# Patient Record
Sex: MALE | Race: WHITE | NOT HISPANIC OR LATINO | Employment: OTHER
[De-identification: names, ages, dates, MRNs, and addresses within clinical notes are randomized per-mention and may not be internally consistent; named-entity substitution may affect disease eponyms.]

---

## 2017-11-13 ENCOUNTER — RECORDS - HEALTHEAST (OUTPATIENT)
Dept: ADMINISTRATIVE | Facility: OTHER | Age: 62
End: 2017-11-13

## 2017-11-15 ENCOUNTER — AMBULATORY - HEALTHEAST (OUTPATIENT)
Dept: SURGERY | Facility: CLINIC | Age: 62
End: 2017-11-15

## 2017-11-15 DIAGNOSIS — K42.9 UMBILICAL HERNIA WITHOUT OBSTRUCTION OR GANGRENE: ICD-10-CM

## 2017-11-17 ENCOUNTER — OFFICE VISIT - HEALTHEAST (OUTPATIENT)
Dept: SURGERY | Facility: CLINIC | Age: 62
End: 2017-11-17

## 2017-11-17 DIAGNOSIS — K42.9 UMBILICAL HERNIA WITHOUT OBSTRUCTION AND WITHOUT GANGRENE: ICD-10-CM

## 2017-11-17 DIAGNOSIS — D36.7 CYST, DERMOID, ARM, LEFT: ICD-10-CM

## 2017-11-17 ASSESSMENT — MIFFLIN-ST. JEOR: SCORE: 2387.6

## 2017-12-06 ASSESSMENT — MIFFLIN-ST. JEOR: SCORE: 2378.07

## 2017-12-07 ENCOUNTER — SURGERY - HEALTHEAST (OUTPATIENT)
Dept: SURGERY | Facility: HOSPITAL | Age: 62
End: 2017-12-07

## 2017-12-07 ENCOUNTER — ANESTHESIA - HEALTHEAST (OUTPATIENT)
Dept: SURGERY | Facility: HOSPITAL | Age: 62
End: 2017-12-07

## 2017-12-22 ENCOUNTER — OFFICE VISIT - HEALTHEAST (OUTPATIENT)
Dept: SURGERY | Facility: CLINIC | Age: 62
End: 2017-12-22

## 2017-12-22 DIAGNOSIS — K42.9 UMBILICAL HERNIA WITHOUT OBSTRUCTION AND WITHOUT GANGRENE: ICD-10-CM

## 2017-12-22 ASSESSMENT — MIFFLIN-ST. JEOR: SCORE: 2378.07

## 2019-03-19 ENCOUNTER — RECORDS - HEALTHEAST (OUTPATIENT)
Dept: LAB | Facility: CLINIC | Age: 64
End: 2019-03-19

## 2019-03-19 LAB
ANION GAP SERPL CALCULATED.3IONS-SCNC: 8 MMOL/L (ref 5–18)
BUN SERPL-MCNC: 16 MG/DL (ref 8–22)
CALCIUM SERPL-MCNC: 9.7 MG/DL (ref 8.5–10.5)
CHLORIDE BLD-SCNC: 102 MMOL/L (ref 98–107)
CO2 SERPL-SCNC: 29 MMOL/L (ref 22–31)
CREAT SERPL-MCNC: 0.9 MG/DL (ref 0.7–1.3)
GFR SERPL CREATININE-BSD FRML MDRD: >60 ML/MIN/1.73M2
GLUCOSE BLD-MCNC: 102 MG/DL (ref 70–125)
POTASSIUM BLD-SCNC: 4.6 MMOL/L (ref 3.5–5)
SODIUM SERPL-SCNC: 139 MMOL/L (ref 136–145)

## 2019-10-24 ENCOUNTER — HOSPITAL ENCOUNTER (EMERGENCY)
Facility: CLINIC | Age: 64
Discharge: HOME | End: 2019-10-24
Attending: NURSE PRACTITIONER | Admitting: NURSE PRACTITIONER
Payer: COMMERCIAL

## 2019-10-24 ENCOUNTER — APPOINTMENT (OUTPATIENT)
Dept: GENERAL RADIOLOGY | Facility: CLINIC | Age: 64
End: 2019-10-24
Attending: NURSE PRACTITIONER
Payer: COMMERCIAL

## 2019-10-24 VITALS
TEMPERATURE: 97.6 F | HEIGHT: 71 IN | WEIGHT: 300 LBS | SYSTOLIC BLOOD PRESSURE: 118 MMHG | DIASTOLIC BLOOD PRESSURE: 89 MMHG | OXYGEN SATURATION: 94 % | BODY MASS INDEX: 42 KG/M2 | HEART RATE: 64 BPM

## 2019-10-24 DIAGNOSIS — J06.9 VIRAL URI: ICD-10-CM

## 2019-10-24 DIAGNOSIS — B08.5 ACUTE HERPANGINA: ICD-10-CM

## 2019-10-24 DIAGNOSIS — L25.9 CONTACT DERMATITIS: ICD-10-CM

## 2019-10-24 PROBLEM — G47.33 OBSTRUCTIVE SLEEP APNEA SYNDROME: Status: ACTIVE | Noted: 2017-12-07

## 2019-10-24 PROBLEM — I10 ESSENTIAL HYPERTENSION: Status: ACTIVE | Noted: 2017-12-07

## 2019-10-24 LAB
ALBUMIN SERPL-MCNC: 3.5 G/DL (ref 3.4–5)
ALP SERPL-CCNC: 58 U/L (ref 40–150)
ALT SERPL W P-5'-P-CCNC: 35 U/L (ref 0–70)
ANION GAP SERPL CALCULATED.3IONS-SCNC: 5 MMOL/L (ref 3–14)
AST SERPL W P-5'-P-CCNC: 20 U/L (ref 0–45)
BASOPHILS # BLD AUTO: 0 10E9/L (ref 0–0.2)
BASOPHILS NFR BLD AUTO: 0.5 %
BILIRUB SERPL-MCNC: 0.4 MG/DL (ref 0.2–1.3)
BUN SERPL-MCNC: 11 MG/DL (ref 7–30)
CALCIUM SERPL-MCNC: 9 MG/DL (ref 8.5–10.1)
CHLORIDE SERPL-SCNC: 104 MMOL/L (ref 94–109)
CO2 SERPL-SCNC: 27 MMOL/L (ref 20–32)
CREAT SERPL-MCNC: 1.05 MG/DL (ref 0.66–1.25)
DEPRECATED S PYO AG THROAT QL EIA: NORMAL
DIFFERENTIAL METHOD BLD: NORMAL
EOSINOPHIL # BLD AUTO: 0.4 10E9/L (ref 0–0.7)
EOSINOPHIL NFR BLD AUTO: 4.5 %
ERYTHROCYTE [DISTWIDTH] IN BLOOD BY AUTOMATED COUNT: 13.2 % (ref 10–15)
GFR SERPL CREATININE-BSD FRML MDRD: 76 ML/MIN/{1.73_M2}
GLUCOSE SERPL-MCNC: 110 MG/DL (ref 70–99)
HCT VFR BLD AUTO: 45.9 % (ref 40–53)
HGB BLD-MCNC: 15.2 G/DL (ref 13.3–17.7)
IMM GRANULOCYTES # BLD: 0.1 10E9/L (ref 0–0.4)
IMM GRANULOCYTES NFR BLD: 0.8 %
LYMPHOCYTES # BLD AUTO: 1.2 10E9/L (ref 0.8–5.3)
LYMPHOCYTES NFR BLD AUTO: 15.1 %
MCH RBC QN AUTO: 30 PG (ref 26.5–33)
MCHC RBC AUTO-ENTMCNC: 33.1 G/DL (ref 31.5–36.5)
MCV RBC AUTO: 91 FL (ref 78–100)
MONOCYTES # BLD AUTO: 0.7 10E9/L (ref 0–1.3)
MONOCYTES NFR BLD AUTO: 8.8 %
NEUTROPHILS # BLD AUTO: 5.4 10E9/L (ref 1.6–8.3)
NEUTROPHILS NFR BLD AUTO: 70.3 %
NRBC # BLD AUTO: 0 10*3/UL
NRBC BLD AUTO-RTO: 0 /100
PLATELET # BLD AUTO: 294 10E9/L (ref 150–450)
POTASSIUM SERPL-SCNC: 4.1 MMOL/L (ref 3.4–5.3)
PROT SERPL-MCNC: 7.2 G/DL (ref 6.8–8.8)
RBC # BLD AUTO: 5.06 10E12/L (ref 4.4–5.9)
SODIUM SERPL-SCNC: 136 MMOL/L (ref 133–144)
SPECIMEN SOURCE: NORMAL
WBC # BLD AUTO: 7.7 10E9/L (ref 4–11)

## 2019-10-24 PROCEDURE — 87880 STREP A ASSAY W/OPTIC: CPT | Performed by: NURSE PRACTITIONER

## 2019-10-24 PROCEDURE — 96360 HYDRATION IV INFUSION INIT: CPT | Performed by: NURSE PRACTITIONER

## 2019-10-24 PROCEDURE — 99284 EMERGENCY DEPT VISIT MOD MDM: CPT | Mod: 25 | Performed by: NURSE PRACTITIONER

## 2019-10-24 PROCEDURE — 71046 X-RAY EXAM CHEST 2 VIEWS: CPT

## 2019-10-24 PROCEDURE — 25000132 ZZH RX MED GY IP 250 OP 250 PS 637: Performed by: NURSE PRACTITIONER

## 2019-10-24 PROCEDURE — 25000128 H RX IP 250 OP 636: Performed by: NURSE PRACTITIONER

## 2019-10-24 PROCEDURE — 99284 EMERGENCY DEPT VISIT MOD MDM: CPT | Mod: Z6 | Performed by: NURSE PRACTITIONER

## 2019-10-24 PROCEDURE — 87081 CULTURE SCREEN ONLY: CPT | Performed by: NURSE PRACTITIONER

## 2019-10-24 PROCEDURE — 85025 COMPLETE CBC W/AUTO DIFF WBC: CPT | Performed by: NURSE PRACTITIONER

## 2019-10-24 PROCEDURE — 25000131 ZZH RX MED GY IP 250 OP 636 PS 637: Performed by: NURSE PRACTITIONER

## 2019-10-24 PROCEDURE — 80053 COMPREHEN METABOLIC PANEL: CPT | Performed by: NURSE PRACTITIONER

## 2019-10-24 RX ORDER — PREDNISONE 20 MG/1
TABLET ORAL
Qty: 9 TABLET | Refills: 0 | Status: SHIPPED | OUTPATIENT
Start: 2019-10-24 | End: 2019-11-03

## 2019-10-24 RX ORDER — PREDNISONE 20 MG/1
40 TABLET ORAL ONCE
Status: COMPLETED | OUTPATIENT
Start: 2019-10-24 | End: 2019-10-24

## 2019-10-24 RX ORDER — LISINOPRIL 10 MG/1
TABLET ORAL
COMMUNITY
Start: 2018-09-06

## 2019-10-24 RX ORDER — DIPHENHYDRAMINE HCL 25 MG
50 CAPSULE ORAL ONCE
Status: COMPLETED | OUTPATIENT
Start: 2019-10-24 | End: 2019-10-24

## 2019-10-24 RX ORDER — SODIUM CHLORIDE 9 MG/ML
1000 INJECTION, SOLUTION INTRAVENOUS CONTINUOUS
Status: DISCONTINUED | OUTPATIENT
Start: 2019-10-24 | End: 2019-10-24 | Stop reason: HOSPADM

## 2019-10-24 RX ORDER — METOPROLOL SUCCINATE 50 MG/1
50 TABLET, EXTENDED RELEASE ORAL
COMMUNITY

## 2019-10-24 RX ADMIN — PREDNISONE 40 MG: 20 TABLET ORAL at 10:38

## 2019-10-24 RX ADMIN — SODIUM CHLORIDE 1000 ML: 9 INJECTION, SOLUTION INTRAVENOUS at 10:38

## 2019-10-24 RX ADMIN — DIPHENHYDRAMINE HYDROCHLORIDE 50 MG: 25 CAPSULE ORAL at 10:38

## 2019-10-24 ASSESSMENT — ENCOUNTER SYMPTOMS
NAUSEA: 0
MYALGIAS: 0
SHORTNESS OF BREATH: 1
TROUBLE SWALLOWING: 1
CHILLS: 1
DIZZINESS: 0
VOMITING: 0
LIGHT-HEADEDNESS: 0
SORE THROAT: 1
BACK PAIN: 0
HEADACHES: 0
COUGH: 1
ABDOMINAL PAIN: 0
WHEEZING: 0
FEVER: 0

## 2019-10-24 ASSESSMENT — MIFFLIN-ST. JEOR: SCORE: 2182.92

## 2019-10-24 NOTE — ED PROVIDER NOTES
"  History     Chief Complaint   Patient presents with     Allergic Reaction     poss poison oak, swollen lip and tongue, hard to swallow     HPI  Onofre Garg is a 64 year old male who presents to the emergency department for evaluation of exposure to poison oak.  This occurred 3 days ago when he was mowing grass.  Patient developed widespread pruritic rash.  Predominant on his upper arms.  Patient also reports a sore throat, and feels his lip and tongue are swollen.  Pain with swallowing.  Also reports exposure to grandchildren with respiratory \"cold\" and another with pneumonia.     Allergies:  Allergies   Allergen Reactions     Oxycodone      Abdominal pain; see 4-23-12 ED report, and [facility] report, [facility].        Problem List:    Patient Active Problem List    Diagnosis Date Noted     Essential hypertension 12/07/2017     Priority: Medium     Obstructive sleep apnea syndrome 12/07/2017     Priority: Medium     Cardiomyopathy (H) 06/24/2016     Priority: Medium     Advanced directives, counseling/discussion 09/24/2012     Priority: Medium     Discussed advance care planning with patient; however, patient declined at this time. 9/24/2012          Seasonal allergic rhinitis 09/24/2012     Priority: Medium     CARDIOVASCULAR SCREENING; LDL GOAL LESS THAN 160 10/31/2010     Priority: Medium     Diverticulosis of colon 12/22/2008     Priority: Medium     Colonoscopy Dec 2008       Screening for hypertension 12/22/2008     Priority: Medium     Impotence of organic origin 08/17/2006     Priority: Medium     Obesity 08/17/2006     Priority: Medium     Problem list name updated by automated process. Provider to review          Past Medical History:    Past Medical History:   Diagnosis Date     Disorders of bursae and tendons in shoulder region, unspecified      Obesity, unspecified        Past Surgical History:    Past Surgical History:   Procedure Laterality Date     ENDOSCOPIC RETROGRADE " "CHOLANGIOPANCREATOGRAM  10/4/2011    Procedure:ENDOSCOPIC RETROGRADE CHOLANGIOPANCREATOGRAM; ERCP, biliary duct sphincterotomy, baloon dilatation, stone extraction and stent placement; Surgeon:TERE BARBOSA; Location:[facility] OR     LAPAROSCOPIC CHOLECYSTECTOMY  10/6/2011    Procedure:LAPAROSCOPIC CHOLECYSTECTOMY; Laparoscopic Cholecystectomy; Surgeon:HALEY TRINH; Location:[facility] OR       Family History:    Family History   Problem Relation Age of Onset     Diabetes Mother      Cerebrovascular Disease Mother      Eye Disorder Father      Allergies Daughter      Allergies Daughter        Social History:  Marital Status:   [2]  Social History     Tobacco Use     Smoking status: Never Smoker     Smokeless tobacco: Never Used   Substance Use Topics     Alcohol use: Yes     Alcohol/week: 5.0 standard drinks     Types: 6 Cans of beer per week     Comment: on weekends / socially     Drug use: No        Medications:    lisinopril (PRINIVIL/ZESTRIL) 10 MG tablet  predniSONE (DELTASONE) 20 MG tablet  fluticasone (FLONASE) 50 MCG/ACT nasal spray  metoprolol succinate ER (TOPROL-XL) 50 MG 24 hr tablet  pseudoePHEDrine (SUDAFED) 120 MG 12 hr tablet  sildenafil (VIAGRA) 50 MG tablet          Review of Systems   Constitutional: Positive for chills. Negative for fever.   HENT: Positive for congestion, sore throat and trouble swallowing.    Respiratory: Positive for cough and shortness of breath. Negative for wheezing.    Cardiovascular: Negative for chest pain.   Gastrointestinal: Negative for abdominal pain, nausea and vomiting.   Genitourinary: Negative.    Musculoskeletal: Negative for back pain and myalgias.   Skin: Positive for rash.   Neurological: Negative for dizziness, light-headedness and headaches.       Physical Exam   BP: (!) 154/83  Pulse: 72  Temp: 97.6  F (36.4  C)  Height: 180.3 cm (5' 11\")  Weight: 136.1 kg (300 lb)  SpO2: 93 %      Physical Exam  Constitutional:       General: He is in acute distress. "      Appearance: He is obese.   HENT:      Head: Normocephalic and atraumatic.      Right Ear: Tympanic membrane and external ear normal.      Left Ear: Tympanic membrane and external ear normal.      Nose: Nose normal.      Mouth/Throat:      Lips: Pink.      Mouth: Mucous membranes are moist.      Tongue: No lesions. Tongue does not protrude in midline.      Palate: Lesions present. No mass.      Pharynx: Oropharynx is clear. Uvula midline. Posterior oropharyngeal erythema present. No pharyngeal swelling, oropharyngeal exudate or uvula swelling.      Comments: Ulcerations along the buccal region of the lower lip.  Posterior soft palate ulcerated lesions.  Cardiovascular:      Rate and Rhythm: Normal rate and regular rhythm.   Pulmonary:      Effort: Pulmonary effort is normal.      Breath sounds: Normal breath sounds.   Skin:     Findings: Rash present. Rash is urticarial (vesicular and papular lesions widespread scattered and most coalesced on the underside of his upper arms).      Comments: Scattered lesions. Lesion on hands, palms, upper arms and in mouth. Reports very pruritic.    Neurological:      Mental Status: He is alert.         ED Course        Procedures             Results for orders placed or performed during the hospital encounter of 10/24/19 (from the past 24 hour(s))   CBC with platelets differential   Result Value Ref Range    WBC 7.7 4.0 - 11.0 10e9/L    RBC Count 5.06 4.4 - 5.9 10e12/L    Hemoglobin 15.2 13.3 - 17.7 g/dL    Hematocrit 45.9 40.0 - 53.0 %    MCV 91 78 - 100 fl    MCH 30.0 26.5 - 33.0 pg    MCHC 33.1 31.5 - 36.5 g/dL    RDW 13.2 10.0 - 15.0 %    Platelet Count 294 150 - 450 10e9/L    Diff Method Automated Method     % Neutrophils 70.3 %    % Lymphocytes 15.1 %    % Monocytes 8.8 %    % Eosinophils 4.5 %    % Basophils 0.5 %    % Immature Granulocytes 0.8 %    Nucleated RBCs 0 0 /100    Absolute Neutrophil 5.4 1.6 - 8.3 10e9/L    Absolute Lymphocytes 1.2 0.8 - 5.3 10e9/L     Absolute Monocytes 0.7 0.0 - 1.3 10e9/L    Absolute Eosinophils 0.4 0.0 - 0.7 10e9/L    Absolute Basophils 0.0 0.0 - 0.2 10e9/L    Abs Immature Granulocytes 0.1 0 - 0.4 10e9/L    Absolute Nucleated RBC 0.0    Comprehensive metabolic panel   Result Value Ref Range    Sodium 136 133 - 144 mmol/L    Potassium 4.1 3.4 - 5.3 mmol/L    Chloride 104 94 - 109 mmol/L    Carbon Dioxide 27 20 - 32 mmol/L    Anion Gap 5 3 - 14 mmol/L    Glucose 110 (H) 70 - 99 mg/dL    Urea Nitrogen 11 7 - 30 mg/dL    Creatinine 1.05 0.66 - 1.25 mg/dL    GFR Estimate 76 >60 mL/min/[1.73_m2]    GFR Estimate If Black 88 >60 mL/min/[1.73_m2]    Calcium 9.0 8.5 - 10.1 mg/dL    Bilirubin Total 0.4 0.2 - 1.3 mg/dL    Albumin 3.5 3.4 - 5.0 g/dL    Protein Total 7.2 6.8 - 8.8 g/dL    Alkaline Phosphatase 58 40 - 150 U/L    ALT 35 0 - 70 U/L    AST 20 0 - 45 U/L   Rapid Strep Screen   Result Value Ref Range    Specimen Description Throat     Rapid Strep A Screen       NEGATIVE: No Group A streptococcal antigen detected by immunoassay, await culture report.   XR Chest 2 Views    Narrative    CHEST TWO VIEWS   10/24/2019 10:53 AM     HISTORY: Cough, short of breath.    COMPARISON: Chest x-ray on 4/23/2012      Impression    IMPRESSION: No acute airspace disease.    RENATO STRONG MD       Medications   0.9% sodium chloride BOLUS (0 mLs Intravenous Stopped 10/24/19 1145)     Followed by   sodium chloride 0.9% infusion (has no administration in time range)   diphenhydrAMINE (BENADRYL) capsule 50 mg (50 mg Oral Given 10/24/19 1038)   predniSONE (DELTASONE) tablet 40 mg (40 mg Oral Given 10/24/19 1038)       Assessments & Plan (with Medical Decision Making)     64 year old male who presents to the ED with pruritic rash, sore throat and lowe lip swelling.  Patient reports exposure to poison oak while mowing grass on Saturday and [place] with subsequent pruritic rash. Also with exposure to grandchildren with URI illness and another with pneumonia, and  developed sore throat and lower lip feels swollen the last 2 days. Positive on ROS for cough, congestion, mouth sores, and short of breath.    On exam patient is morbidly obese, thick neck. Lung sounds CTA. No tachypnea or hypoxia. Several ulcerated lesions (herpangina) noted on the buccal surface of his lower lip and along the posterior soft palate. Posterior oropharynx erythema. urticarial lesions scattered and widespread, but coalesced along the underside of both his upper arms.  I have low suspicion for anaphylactic reaction. I did consider angioedema since he complains of tongue and lip swelling and is on Lisinopril --- however, patient has no obvious swelling of the tongue, it does not protrude, patient is easily able to swallow, and talking without problems. RST is negative. Labs are unremarkable. Chest xray is negative for pneumonia. I suspect patient is having a contact dermatitis due to exposure/reaction to poison oak/ivy/sumac.  His posterior oropharynx and herpangina consistent with an enterovirus that is likely due this exposure to grandchildren with similar respiratory illness. Patient was given PO benadryl, PO prednisone and IV NS bolus here in the ED.       Plan as follows for treatment of contact dermatitis (poison oak reaction):    Prednisone 20 mg tablets, take as follows:  (starting tomorrow morning, you received today's dose in the emergency department)  2 tablets tomorrow and Saturday, (10/25 and 10/26)  Then 1 tablet Sunday and Monday, (10/27 and 10/28)  Then 1/2 tablet Tuesday-Sunday. (10/29 - 11/3)   Benadryl 25-50 mg every 8 hours as needed for itching.    Treatment for viral uri/herpangina:  Drink plenty of fluids to stay hydrated.  Tylenol or Ibuprofen for pain.  Return to the emergency department for fevers, shortness of breath, chest pain, increased swelling of lip/tongue, or any new symptoms of concern.    I have reviewed the nursing notes.    I have reviewed the findings, diagnosis,  plan and need for follow up with the patient.      Discharge Medication List as of 10/24/2019 11:46 AM      START taking these medications    Details   predniSONE (DELTASONE) 20 MG tablet 2 tabs day 1-2, then 1 tab days 3-4, then 1/2 tab daily for 6 days, Disp-9 tablet, R-0, E-PrescribeStarting tomorrow 10/25/2019. Received dose in the emergency department today.             Final diagnoses:   Contact dermatitis - exposure to poison oak   Viral URI   Acute herpangina       10/24/2019   [facility] EMERGENCY DEPARTMENT     Patti Zepeda APRN CNP  10/24/19 1257

## 2019-10-24 NOTE — ED NOTES
Allergic reaction to poison oak while mowing. Rash under armpits and legs. Lip and tongue swollen. Ulcer lesions in mouth. Feeling SOB. Using cream for rashes.

## 2019-10-24 NOTE — DISCHARGE INSTRUCTIONS
Prednisone 20 mg tablets, take as follows:  (starting tomorrow morning, you received today's dose in the emergency department)  2 tablets tomorrow and Saturday, (10/25 and 10/26)  Then 1 tablet Sunday and Monday, (10/27 and 10/28)  Then 1/2 tablet Tuesday-Sunday. (10/29 - 11/3)     Benadryl 25-50 mg every 8 hours as needed for itching.    Drink plenty of fluids to stay hydrated.  Tylenol or Ibuprofen for pain.  Return to the emergency department for fevers, shortness of breath, chest pain, increased swelling of lip/tongue, or any new symptoms of concern.

## 2019-10-24 NOTE — ED AVS SNAPSHOT
[facility] Emergency Department  [address] [address]  Phone:  [phone]  Fax:  [phone]                                    Onofre Garg   MRN: [id number]    Department:  [facility] Emergency Department   Date of Visit:  10/24/2019           After Visit Summary Signature Page    I have received my discharge instructions, and my questions have been answered. I have discussed any challenges I see with this plan with the nurse or doctor.    ..........................................................................................................................................  Patient/Patient Representative Signature      ..........................................................................................................................................  Patient Representative Print Name and Relationship to Patient    ..................................................               ................................................  Date                                   Time    ..........................................................................................................................................  Reviewed by Signature/Title    ...................................................              ..............................................  Date                                               Time          22EPIC Rev 08/18

## 2019-10-26 LAB
BACTERIA SPEC CULT: NORMAL
Lab: NORMAL
SPECIMEN SOURCE: NORMAL

## 2019-12-30 ENCOUNTER — RECORDS - HEALTHEAST (OUTPATIENT)
Dept: LAB | Facility: CLINIC | Age: 64
End: 2019-12-30

## 2019-12-30 LAB
ALBUMIN UR-MCNC: NEGATIVE MG/DL
APPEARANCE UR: CLEAR
BACTERIA #/AREA URNS HPF: ABNORMAL HPF
BILIRUB UR QL STRIP: NEGATIVE
COLOR UR AUTO: YELLOW
GLUCOSE UR STRIP-MCNC: NEGATIVE MG/DL
HGB UR QL STRIP: NEGATIVE
KETONES UR STRIP-MCNC: NEGATIVE MG/DL
LEUKOCYTE ESTERASE UR QL STRIP: NEGATIVE
MUCOUS THREADS #/AREA URNS LPF: ABNORMAL LPF
NITRATE UR QL: NEGATIVE
PH UR STRIP: 6.5 [PH] (ref 4.5–8)
RBC #/AREA URNS AUTO: ABNORMAL HPF
SP GR UR STRIP: 1.01 (ref 1–1.03)
SQUAMOUS #/AREA URNS AUTO: ABNORMAL LPF
UROBILINOGEN UR STRIP-ACNC: ABNORMAL
WBC #/AREA URNS AUTO: ABNORMAL HPF

## 2019-12-31 LAB — BACTERIA SPEC CULT: NO GROWTH

## 2020-04-13 ENCOUNTER — RECORDS - HEALTHEAST (OUTPATIENT)
Dept: LAB | Facility: CLINIC | Age: 65
End: 2020-04-13

## 2020-04-13 LAB
ALBUMIN SERPL-MCNC: 3.7 G/DL (ref 3.5–5)
ANION GAP SERPL CALCULATED.3IONS-SCNC: 10 MMOL/L (ref 5–18)
BUN SERPL-MCNC: 16 MG/DL (ref 8–22)
CALCIUM SERPL-MCNC: 9.2 MG/DL (ref 8.5–10.5)
CHLORIDE BLD-SCNC: 101 MMOL/L (ref 98–107)
CHOLEST SERPL-MCNC: 216 MG/DL
CO2 SERPL-SCNC: 28 MMOL/L (ref 22–31)
CREAT SERPL-MCNC: 1.11 MG/DL (ref 0.7–1.3)
FASTING STATUS PATIENT QL REPORTED: ABNORMAL
GFR SERPL CREATININE-BSD FRML MDRD: >60 ML/MIN/1.73M2
GLUCOSE BLD-MCNC: 100 MG/DL (ref 70–125)
HDLC SERPL-MCNC: 42 MG/DL
LDLC SERPL CALC-MCNC: 123 MG/DL
LDLC SERPL CALC-MCNC: ABNORMAL MG/DL
PHOSPHATE SERPL-MCNC: 3.3 MG/DL (ref 2.5–4.5)
POTASSIUM BLD-SCNC: 4.8 MMOL/L (ref 3.5–5)
PSA SERPL-MCNC: 4.7 NG/ML (ref 0–4.5)
SODIUM SERPL-SCNC: 139 MMOL/L (ref 136–145)
TRIGL SERPL-MCNC: 528 MG/DL

## 2020-04-29 ENCOUNTER — COMMUNICATION - HEALTHEAST (OUTPATIENT)
Dept: SCHEDULING | Facility: CLINIC | Age: 65
End: 2020-04-29

## 2020-04-29 ENCOUNTER — NURSE TRIAGE (OUTPATIENT)
Dept: NURSING | Facility: CLINIC | Age: 65
End: 2020-04-29

## 2020-05-04 ENCOUNTER — AMBULATORY - HEALTHEAST (OUTPATIENT)
Dept: FAMILY MEDICINE | Facility: CLINIC | Age: 65
End: 2020-05-04

## 2020-05-04 DIAGNOSIS — E66.9 OBESITY WITH SERIOUS COMORBIDITY, UNSPECIFIED CLASSIFICATION, UNSPECIFIED OBESITY TYPE: ICD-10-CM

## 2020-05-05 ENCOUNTER — AMBULATORY - HEALTHEAST (OUTPATIENT)
Dept: LAB | Facility: CLINIC | Age: 65
End: 2020-05-05

## 2020-05-05 DIAGNOSIS — E66.9 OBESITY WITH SERIOUS COMORBIDITY, UNSPECIFIED CLASSIFICATION, UNSPECIFIED OBESITY TYPE: ICD-10-CM

## 2020-05-05 LAB
ALBUMIN SERPL-MCNC: 3.8 G/DL (ref 3.5–5)
ALP SERPL-CCNC: 62 U/L (ref 45–120)
ALT SERPL W P-5'-P-CCNC: 36 U/L (ref 0–45)
AST SERPL W P-5'-P-CCNC: 27 U/L (ref 0–40)
BILIRUB DIRECT SERPL-MCNC: 0.1 MG/DL
BILIRUB SERPL-MCNC: 0.4 MG/DL (ref 0–1)
HBA1C MFR BLD: 5.3 % (ref 3.5–6)
PROT SERPL-MCNC: 6.6 G/DL (ref 6–8)
TSH SERPL DL<=0.005 MIU/L-ACNC: 4.18 UIU/ML (ref 0.3–5)

## 2020-05-08 LAB — 25(OH)D3 SERPL-MCNC: 28 NG/ML (ref 30–80)

## 2020-05-11 ENCOUNTER — OFFICE VISIT - HEALTHEAST (OUTPATIENT)
Dept: FAMILY MEDICINE | Facility: CLINIC | Age: 65
End: 2020-05-11

## 2020-05-11 DIAGNOSIS — G47.33 OBSTRUCTIVE SLEEP APNEA: ICD-10-CM

## 2020-05-11 DIAGNOSIS — E78.1 HYPERTRIGLYCERIDEMIA: ICD-10-CM

## 2020-05-11 DIAGNOSIS — E66.01 MORBID OBESITY (H): ICD-10-CM

## 2020-05-11 DIAGNOSIS — I10 ESSENTIAL HYPERTENSION: ICD-10-CM

## 2020-05-12 ENCOUNTER — COMMUNICATION - HEALTHEAST (OUTPATIENT)
Dept: FAMILY MEDICINE | Facility: CLINIC | Age: 65
End: 2020-05-12

## 2020-06-08 ENCOUNTER — OFFICE VISIT - HEALTHEAST (OUTPATIENT)
Dept: FAMILY MEDICINE | Facility: CLINIC | Age: 65
End: 2020-06-08

## 2020-06-08 DIAGNOSIS — E66.01 MORBID OBESITY (H): ICD-10-CM

## 2020-06-08 DIAGNOSIS — E66.01 MORBID OBESITY WITH BMI OF 45.0-49.9, ADULT (H): ICD-10-CM

## 2020-07-02 ENCOUNTER — COMMUNICATION - HEALTHEAST (OUTPATIENT)
Dept: FAMILY MEDICINE | Facility: CLINIC | Age: 65
End: 2020-07-02

## 2020-07-02 DIAGNOSIS — E66.01 MORBID OBESITY (H): ICD-10-CM

## 2020-07-06 ENCOUNTER — COMMUNICATION - HEALTHEAST (OUTPATIENT)
Dept: FAMILY MEDICINE | Facility: CLINIC | Age: 65
End: 2020-07-06

## 2020-07-06 ENCOUNTER — OFFICE VISIT - HEALTHEAST (OUTPATIENT)
Dept: FAMILY MEDICINE | Facility: CLINIC | Age: 65
End: 2020-07-06

## 2020-07-06 DIAGNOSIS — E66.01 MORBID OBESITY WITH BMI OF 45.0-49.9, ADULT (H): ICD-10-CM

## 2020-07-06 DIAGNOSIS — E66.01 MORBID OBESITY (H): ICD-10-CM

## 2020-07-06 ASSESSMENT — PATIENT HEALTH QUESTIONNAIRE - PHQ9: SUM OF ALL RESPONSES TO PHQ QUESTIONS 1-9: 1

## 2020-08-03 ENCOUNTER — OFFICE VISIT - HEALTHEAST (OUTPATIENT)
Dept: FAMILY MEDICINE | Facility: CLINIC | Age: 65
End: 2020-08-03

## 2020-08-03 DIAGNOSIS — E66.01 MORBID OBESITY WITH BMI OF 45.0-49.9, ADULT (H): ICD-10-CM

## 2020-08-03 DIAGNOSIS — E66.01 MORBID OBESITY (H): ICD-10-CM

## 2020-09-03 ENCOUNTER — OFFICE VISIT - HEALTHEAST (OUTPATIENT)
Dept: FAMILY MEDICINE | Facility: CLINIC | Age: 65
End: 2020-09-03

## 2020-09-03 DIAGNOSIS — E66.01 MORBID OBESITY WITH BMI OF 45.0-49.9, ADULT (H): ICD-10-CM

## 2020-09-21 ENCOUNTER — COMMUNICATION - HEALTHEAST (OUTPATIENT)
Dept: FAMILY MEDICINE | Facility: CLINIC | Age: 65
End: 2020-09-21

## 2020-10-01 ENCOUNTER — OFFICE VISIT - HEALTHEAST (OUTPATIENT)
Dept: FAMILY MEDICINE | Facility: CLINIC | Age: 65
End: 2020-10-01

## 2020-10-01 DIAGNOSIS — E66.01 MORBID OBESITY WITH BMI OF 45.0-49.9, ADULT (H): ICD-10-CM

## 2020-10-17 ENCOUNTER — HOSPITAL ENCOUNTER (EMERGENCY)
Facility: CLINIC | Age: 65
Discharge: HOME | End: 2020-10-17
Attending: EMERGENCY MEDICINE | Admitting: EMERGENCY MEDICINE
Payer: COMMERCIAL

## 2020-10-17 VITALS
HEART RATE: 78 BPM | HEIGHT: 71 IN | RESPIRATION RATE: 18 BRPM | SYSTOLIC BLOOD PRESSURE: 131 MMHG | DIASTOLIC BLOOD PRESSURE: 57 MMHG | BODY MASS INDEX: 44.1 KG/M2 | OXYGEN SATURATION: 95 % | TEMPERATURE: 97.6 F | WEIGHT: 315 LBS

## 2020-10-17 DIAGNOSIS — K61.0 PERIANAL ABSCESS: ICD-10-CM

## 2020-10-17 LAB
ANION GAP SERPL CALCULATED.3IONS-SCNC: 5 MMOL/L (ref 3–14)
BASOPHILS # BLD AUTO: 0 10E9/L (ref 0–0.2)
BASOPHILS NFR BLD AUTO: 0.2 %
BUN SERPL-MCNC: 14 MG/DL (ref 7–30)
CALCIUM SERPL-MCNC: 8.8 MG/DL (ref 8.5–10.1)
CHLORIDE SERPL-SCNC: 105 MMOL/L (ref 94–109)
CO2 SERPL-SCNC: 27 MMOL/L (ref 20–32)
CREAT SERPL-MCNC: 1.05 MG/DL (ref 0.66–1.25)
DIFFERENTIAL METHOD BLD: ABNORMAL
EOSINOPHIL # BLD AUTO: 0.1 10E9/L (ref 0–0.7)
EOSINOPHIL NFR BLD AUTO: 1.1 %
ERYTHROCYTE [DISTWIDTH] IN BLOOD BY AUTOMATED COUNT: 13.6 % (ref 10–15)
GFR SERPL CREATININE-BSD FRML MDRD: 75 ML/MIN/{1.73_M2}
GLUCOSE SERPL-MCNC: 109 MG/DL (ref 70–99)
HCT VFR BLD AUTO: 42.8 % (ref 40–53)
HGB BLD-MCNC: 14.5 G/DL (ref 13.3–17.7)
IMM GRANULOCYTES # BLD: 0.1 10E9/L (ref 0–0.4)
IMM GRANULOCYTES NFR BLD: 0.6 %
LYMPHOCYTES # BLD AUTO: 1.4 10E9/L (ref 0.8–5.3)
LYMPHOCYTES NFR BLD AUTO: 12.8 %
MCH RBC QN AUTO: 29.5 PG (ref 26.5–33)
MCHC RBC AUTO-ENTMCNC: 33.9 G/DL (ref 31.5–36.5)
MCV RBC AUTO: 87 FL (ref 78–100)
MONOCYTES # BLD AUTO: 0.9 10E9/L (ref 0–1.3)
MONOCYTES NFR BLD AUTO: 7.9 %
NEUTROPHILS # BLD AUTO: 8.4 10E9/L (ref 1.6–8.3)
NEUTROPHILS NFR BLD AUTO: 77.4 %
NRBC # BLD AUTO: 0 10*3/UL
NRBC BLD AUTO-RTO: 0 /100
PLATELET # BLD AUTO: 308 10E9/L (ref 150–450)
POTASSIUM SERPL-SCNC: 4.1 MMOL/L (ref 3.4–5.3)
RBC # BLD AUTO: 4.91 10E12/L (ref 4.4–5.9)
SODIUM SERPL-SCNC: 137 MMOL/L (ref 133–144)
WBC # BLD AUTO: 10.8 10E9/L (ref 4–11)

## 2020-10-17 PROCEDURE — 96375 TX/PRO/DX INJ NEW DRUG ADDON: CPT | Performed by: EMERGENCY MEDICINE

## 2020-10-17 PROCEDURE — 96376 TX/PRO/DX INJ SAME DRUG ADON: CPT | Performed by: EMERGENCY MEDICINE

## 2020-10-17 PROCEDURE — 96361 HYDRATE IV INFUSION ADD-ON: CPT | Performed by: EMERGENCY MEDICINE

## 2020-10-17 PROCEDURE — 96374 THER/PROPH/DIAG INJ IV PUSH: CPT | Performed by: EMERGENCY MEDICINE

## 2020-10-17 PROCEDURE — 80048 BASIC METABOLIC PNL TOTAL CA: CPT | Performed by: EMERGENCY MEDICINE

## 2020-10-17 PROCEDURE — 99285 EMERGENCY DEPT VISIT HI MDM: CPT | Performed by: EMERGENCY MEDICINE

## 2020-10-17 PROCEDURE — 258N000003 HC RX IP 258 OP 636: Performed by: EMERGENCY MEDICINE

## 2020-10-17 PROCEDURE — 99285 EMERGENCY DEPT VISIT HI MDM: CPT | Mod: 25 | Performed by: EMERGENCY MEDICINE

## 2020-10-17 PROCEDURE — 250N000011 HC RX IP 250 OP 636: Performed by: EMERGENCY MEDICINE

## 2020-10-17 PROCEDURE — 85025 COMPLETE CBC W/AUTO DIFF WBC: CPT | Performed by: EMERGENCY MEDICINE

## 2020-10-17 PROCEDURE — 46050 I&D PERIANAL ABSCESS SUPFC: CPT | Performed by: EMERGENCY MEDICINE

## 2020-10-17 RX ORDER — ONDANSETRON 2 MG/ML
4 INJECTION INTRAMUSCULAR; INTRAVENOUS ONCE
Status: COMPLETED | OUTPATIENT
Start: 2020-10-17 | End: 2020-10-17

## 2020-10-17 RX ORDER — HYDROMORPHONE HYDROCHLORIDE 1 MG/ML
0.5 INJECTION, SOLUTION INTRAMUSCULAR; INTRAVENOUS; SUBCUTANEOUS ONCE
Status: COMPLETED | OUTPATIENT
Start: 2020-10-17 | End: 2020-10-17

## 2020-10-17 RX ORDER — HYDROMORPHONE HYDROCHLORIDE 2 MG/1
2-4 TABLET ORAL EVERY 6 HOURS PRN
Qty: 12 TABLET | Refills: 0 | Status: SHIPPED | OUTPATIENT
Start: 2020-10-17

## 2020-10-17 RX ORDER — SODIUM CHLORIDE 9 MG/ML
INJECTION, SOLUTION INTRAVENOUS CONTINUOUS
Status: DISCONTINUED | OUTPATIENT
Start: 2020-10-17 | End: 2020-10-17 | Stop reason: HOSPADM

## 2020-10-17 RX ADMIN — ONDANSETRON 4 MG: 2 INJECTION INTRAMUSCULAR; INTRAVENOUS at 15:29

## 2020-10-17 RX ADMIN — SODIUM CHLORIDE: 9 INJECTION, SOLUTION INTRAVENOUS at 15:29

## 2020-10-17 RX ADMIN — HYDROMORPHONE HYDROCHLORIDE 0.5 MG: 1 INJECTION, SOLUTION INTRAMUSCULAR; INTRAVENOUS; SUBCUTANEOUS at 17:03

## 2020-10-17 RX ADMIN — HYDROMORPHONE HYDROCHLORIDE 1 MG: 1 INJECTION, SOLUTION INTRAMUSCULAR; INTRAVENOUS; SUBCUTANEOUS at 15:29

## 2020-10-17 ASSESSMENT — MIFFLIN-ST. JEOR: SCORE: 2486.37

## 2020-10-17 NOTE — ED NOTES
Pt c/o pain on tailbone that he states is a cyst. Has had similar cysts in the past. Denies any chills or fevers.

## 2020-10-17 NOTE — ED AVS SNAPSHOT
[facility] Emergency Dept  [address] [address]  Phone: [phone]  Fax: [phone]                                    Onofre Garg   MRN: [id number]    Department: [facility] Emergency Dept   Date of Visit: 10/17/2020           After Visit Summary Signature Page    I have received my discharge instructions, and my questions have been answered. I have discussed any challenges I see with this plan with the nurse or doctor.    ..........................................................................................................................................  Patient/Patient Representative Signature      ..........................................................................................................................................  Patient Representative Print Name and Relationship to Patient    ..................................................               ................................................  Date                                   Time    ..........................................................................................................................................  Reviewed by Signature/Title    ...................................................              ..............................................  Date                                               Time          22EPIC Rev 08/18

## 2020-10-17 NOTE — ED PROVIDER NOTES
"  History     Chief Complaint   Patient presents with     Cyst     tailbone     HPI  Onofre Garg is a 65 year old male with several days of worsening, enlarging painful lesion in the superior perianal region.  No drainage.  No fever or chills.  No abdominal pain.  No prior history of similar abscesses or MRSA.  He reports that the pain is severe, constant, 'ungodly\" and unrelieved by OTC analgesics.    Allergies:  Allergies   Allergen Reactions     Oxycodone      Abdominal pain; see 4-23-12 ED report, and [facility] report, [facility].        Problem List:    Patient Active Problem List    Diagnosis Date Noted     Essential hypertension 12/07/2017     Priority: Medium     Obstructive sleep apnea syndrome 12/07/2017     Priority: Medium     Cardiomyopathy (H) 06/24/2016     Priority: Medium     Advanced directives, counseling/discussion 09/24/2012     Priority: Medium     Discussed advance care planning with patient; however, patient declined at this time. 9/24/2012          Seasonal allergic rhinitis 09/24/2012     Priority: Medium     CARDIOVASCULAR SCREENING; LDL GOAL LESS THAN 160 10/31/2010     Priority: Medium     Diverticulosis of colon 12/22/2008     Priority: Medium     Colonoscopy Dec 2008       Screening for hypertension 12/22/2008     Priority: Medium     Impotence of organic origin 08/17/2006     Priority: Medium     Obesity 08/17/2006     Priority: Medium     Problem list name updated by automated process. Provider to review          Past Medical History:    Past Medical History:   Diagnosis Date     Disorders of bursae and tendons in shoulder region, unspecified      Obesity, unspecified        Past Surgical History:    Past Surgical History:   Procedure Laterality Date     ENDOSCOPIC RETROGRADE CHOLANGIOPANCREATOGRAM  10/4/2011    Procedure:ENDOSCOPIC RETROGRADE CHOLANGIOPANCREATOGRAM; ERCP, biliary duct sphincterotomy, baloon dilatation, stone extraction and stent placement; " "Surgeon:TERE BARBOSA; Location:[facility] OR     LAPAROSCOPIC CHOLECYSTECTOMY  10/6/2011    Procedure:LAPAROSCOPIC CHOLECYSTECTOMY; Laparoscopic Cholecystectomy; Surgeon:HALEY TRINH; Location: OR       Family History:    Family History   Problem Relation Age of Onset     Diabetes Mother      Cerebrovascular Disease Mother      Eye Disorder Father      Allergies Daughter      Allergies Daughter        Social History:  Marital Status:   [2]  Social History     Tobacco Use     Smoking status: Never Smoker     Smokeless tobacco: Never Used   Substance Use Topics     Alcohol use: Yes     Alcohol/week: 5.0 standard drinks     Types: 6 Cans of beer per week     Comment: on weekends / socially     Drug use: No        Medications:         amoxicillin-clavulanate (AUGMENTIN) 875-125 MG tablet       HYDROmorphone (DILAUDID) 2 MG tablet       fluticasone (FLONASE) 50 MCG/ACT nasal spray       lisinopril (PRINIVIL/ZESTRIL) 10 MG tablet       metoprolol succinate ER (TOPROL-XL) 50 MG 24 hr tablet       pseudoePHEDrine (SUDAFED) 120 MG 12 hr tablet       sildenafil (VIAGRA) 50 MG tablet      Review of Systems  As mentioned above in the history present illness.  All other systems were reviewed and are negative.    Physical Exam   BP: (!) 159/81  Pulse: 78  Temp: 97.6  F (36.4  C)  Resp: 22  Height: 180.3 cm (5' 11\")  Weight: (!) 166.9 kg (368 lb)  SpO2: 98 %      Physical Exam  Vitals signs and nursing note reviewed.   Constitutional:       General: He is not in acute distress.     Appearance: Normal appearance. He is well-developed. He is not ill-appearing or diaphoretic.   HENT:      Head: Normocephalic and atraumatic.      Right Ear: External ear normal.      Left Ear: External ear normal.      Nose: Nose normal.   Eyes:      General: No scleral icterus.     Extraocular Movements: Extraocular movements intact.      Conjunctiva/sclera: Conjunctivae normal.   Neck:      Musculoskeletal: Normal range of motion and " neck supple.      Trachea: No tracheal deviation.   Cardiovascular:      Rate and Rhythm: Normal rate and regular rhythm.      Heart sounds: Normal heart sounds. No murmur. No friction rub. No gallop.    Pulmonary:      Effort: Pulmonary effort is normal. No respiratory distress.      Breath sounds: Normal breath sounds. No wheezing, rhonchi or rales.   Abdominal:      General: There is no distension.      Palpations: Abdomen is soft.      Tenderness: There is no abdominal tenderness.   Genitourinary:     Comments:  examination: 3.5 x 2.5 x 2 cm abscess at the 1:00 perianal position.  Soft, fluctuant, without pointing or expressible drainage.  No surrounding cellulitis.  Musculoskeletal: Normal range of motion.         General: No tenderness.      Right lower leg: No edema.      Left lower leg: No edema.   Skin:     General: Skin is warm and dry.      Coloration: Skin is not pale.      Findings: No erythema or rash.   Neurological:      General: No focal deficit present.      Mental Status: He is alert and oriented to person, place, and time.      Coordination: Coordination normal.   Psychiatric:         Mood and Affect: Mood normal.         Behavior: Behavior normal.         ED Course        Procedures               No results found for this or any previous visit (from the past 24 hour(s)).    Medications   HYDROmorphone (DILAUDID) injection 1 mg (1 mg Intravenous Given 10/17/20 1529)   ondansetron (ZOFRAN) injection 4 mg (4 mg Intravenous Given 10/17/20 1529)   HYDROmorphone (PF) (DILAUDID) injection 0.5 mg (0.5 mg Intravenous Given 10/17/20 1703)       3:15 PM - I reviewed the case consult with the surgeon on-call, Dr. Ballesteros.  He will see the patient in the emergency department.      Assessments & Plan (with Medical Decision Making)   Perianal abscess which was incised and drained by the surgeon on-call prior to discharge.  Rx Augmentin prophylactically and Dilaudid po as he reports allergies to oxycodone. He  was provided instructions for supportive care and will return as needed for worsened condition or worsening symptoms, or new problems or concerns.  Wound was left open to drainage and surgeon reported he will see the patient as needed in follow-up.      I have reviewed the nursing notes.    I have reviewed the findings, diagnosis, plan and need for follow up with the patient.    Discharge Medication List as of 10/17/2020  5:09 PM      START taking these medications    Details   amoxicillin-clavulanate (AUGMENTIN) 875-125 MG tablet Take 1 tablet by mouth 2 times daily for 10 days, Disp-20 tablet, R-0, E-Prescribe      HYDROmorphone (DILAUDID) 2 MG tablet Take 1-2 tablets (2-4 mg) by mouth every 6 hours as needed for pain, Disp-12 tablet, R-0, E-Prescribe             Final diagnoses:   Perianal abscess       10/17/2020   [facility] EMERGENCY DEPT     John Kelley MD  10/19/20 2041

## 2020-10-22 ENCOUNTER — COMMUNICATION - HEALTHEAST (OUTPATIENT)
Dept: FAMILY MEDICINE | Facility: CLINIC | Age: 65
End: 2020-10-22

## 2020-10-22 DIAGNOSIS — E66.01 MORBID OBESITY WITH BMI OF 45.0-49.9, ADULT (H): ICD-10-CM

## 2020-11-04 ENCOUNTER — OFFICE VISIT - HEALTHEAST (OUTPATIENT)
Dept: FAMILY MEDICINE | Facility: CLINIC | Age: 65
End: 2020-11-04

## 2020-11-04 DIAGNOSIS — E66.01 MORBID OBESITY WITH BMI OF 45.0-49.9, ADULT (H): ICD-10-CM

## 2020-11-04 DIAGNOSIS — G47.33 OBSTRUCTIVE SLEEP APNEA: ICD-10-CM

## 2020-11-04 DIAGNOSIS — I10 ESSENTIAL HYPERTENSION: ICD-10-CM

## 2020-11-06 ENCOUNTER — OFFICE VISIT - HEALTHEAST (OUTPATIENT)
Dept: SURGERY | Facility: CLINIC | Age: 65
End: 2020-11-06

## 2020-11-06 DIAGNOSIS — E66.01 MORBID OBESITY WITH BMI OF 50.0-59.9, ADULT (H): ICD-10-CM

## 2020-11-06 ASSESSMENT — MIFFLIN-ST. JEOR: SCORE: 2499.97

## 2020-11-10 ENCOUNTER — COMMUNICATION - HEALTHEAST (OUTPATIENT)
Dept: FAMILY MEDICINE | Facility: CLINIC | Age: 65
End: 2020-11-10

## 2020-11-10 DIAGNOSIS — E66.01 MORBID OBESITY WITH BMI OF 45.0-49.9, ADULT (H): ICD-10-CM

## 2020-11-16 ENCOUNTER — COMMUNICATION - HEALTHEAST (OUTPATIENT)
Dept: FAMILY MEDICINE | Facility: CLINIC | Age: 65
End: 2020-11-16

## 2020-11-17 ENCOUNTER — COMMUNICATION - HEALTHEAST (OUTPATIENT)
Dept: FAMILY MEDICINE | Facility: CLINIC | Age: 65
End: 2020-11-17

## 2020-11-17 DIAGNOSIS — E66.01 MORBID OBESITY WITH BMI OF 45.0-49.9, ADULT (H): ICD-10-CM

## 2020-11-20 ENCOUNTER — COMMUNICATION - HEALTHEAST (OUTPATIENT)
Dept: FAMILY MEDICINE | Facility: CLINIC | Age: 65
End: 2020-11-20

## 2020-12-04 ENCOUNTER — OFFICE VISIT - HEALTHEAST (OUTPATIENT)
Dept: SURGERY | Facility: CLINIC | Age: 65
End: 2020-12-04

## 2020-12-04 DIAGNOSIS — E66.01 MORBID OBESITY WITH BMI OF 50.0-59.9, ADULT (H): ICD-10-CM

## 2020-12-04 ASSESSMENT — MIFFLIN-ST. JEOR: SCORE: 2499.97

## 2020-12-11 ENCOUNTER — COMMUNICATION - HEALTHEAST (OUTPATIENT)
Dept: FAMILY MEDICINE | Facility: CLINIC | Age: 65
End: 2020-12-11

## 2020-12-11 DIAGNOSIS — E66.01 MORBID OBESITY WITH BMI OF 45.0-49.9, ADULT (H): ICD-10-CM

## 2021-01-04 ENCOUNTER — OFFICE VISIT - HEALTHEAST (OUTPATIENT)
Dept: SURGERY | Facility: CLINIC | Age: 66
End: 2021-01-04

## 2021-01-04 DIAGNOSIS — E66.01 MORBID OBESITY WITH BMI OF 50.0-59.9, ADULT (H): ICD-10-CM

## 2021-01-04 ASSESSMENT — MIFFLIN-ST. JEOR: SCORE: 2509.05

## 2021-01-12 ENCOUNTER — COMMUNICATION - HEALTHEAST (OUTPATIENT)
Dept: FAMILY MEDICINE | Facility: CLINIC | Age: 66
End: 2021-01-12

## 2021-01-12 DIAGNOSIS — E66.01 MORBID OBESITY WITH BMI OF 45.0-49.9, ADULT (H): ICD-10-CM

## 2021-01-14 ENCOUNTER — COMMUNICATION - HEALTHEAST (OUTPATIENT)
Dept: FAMILY MEDICINE | Facility: CLINIC | Age: 66
End: 2021-01-14

## 2021-01-19 ENCOUNTER — OFFICE VISIT - HEALTHEAST (OUTPATIENT)
Dept: FAMILY MEDICINE | Facility: CLINIC | Age: 66
End: 2021-01-19

## 2021-01-19 DIAGNOSIS — E66.813 CLASS 3 SEVERE OBESITY WITH SERIOUS COMORBIDITY AND BODY MASS INDEX (BMI) OF 50.0 TO 59.9 IN ADULT, UNSPECIFIED OBESITY TYPE (H): ICD-10-CM

## 2021-01-19 DIAGNOSIS — E66.01 CLASS 3 SEVERE OBESITY WITH SERIOUS COMORBIDITY AND BODY MASS INDEX (BMI) OF 50.0 TO 59.9 IN ADULT, UNSPECIFIED OBESITY TYPE (H): ICD-10-CM

## 2021-01-19 ASSESSMENT — MIFFLIN-ST. JEOR: SCORE: 2539.89

## 2021-02-03 ENCOUNTER — OFFICE VISIT - HEALTHEAST (OUTPATIENT)
Dept: SURGERY | Facility: CLINIC | Age: 66
End: 2021-02-03

## 2021-02-03 DIAGNOSIS — E66.01 MORBID OBESITY WITH BMI OF 50.0-59.9, ADULT (H): ICD-10-CM

## 2021-02-03 ASSESSMENT — MIFFLIN-ST. JEOR: SCORE: 2518.12

## 2021-02-08 ENCOUNTER — COMMUNICATION - HEALTHEAST (OUTPATIENT)
Dept: FAMILY MEDICINE | Facility: CLINIC | Age: 66
End: 2021-02-08

## 2021-02-08 DIAGNOSIS — E66.01 MORBID OBESITY WITH BMI OF 45.0-49.9, ADULT (H): ICD-10-CM

## 2021-05-27 ASSESSMENT — PATIENT HEALTH QUESTIONNAIRE - PHQ9: SUM OF ALL RESPONSES TO PHQ QUESTIONS 1-9: 1

## 2021-05-31 VITALS — HEIGHT: 71 IN | BODY MASS INDEX: 44.1 KG/M2 | WEIGHT: 315 LBS

## 2021-05-31 VITALS — WEIGHT: 315 LBS | BODY MASS INDEX: 44.1 KG/M2 | HEIGHT: 71 IN

## 2021-06-04 VITALS — BODY MASS INDEX: 53.7 KG/M2 | WEIGHT: 315 LBS

## 2021-06-04 VITALS — WEIGHT: 315 LBS | BODY MASS INDEX: 54.17 KG/M2

## 2021-06-05 VITALS — WEIGHT: 315 LBS | HEIGHT: 71 IN | BODY MASS INDEX: 44.1 KG/M2

## 2021-06-05 VITALS — BODY MASS INDEX: 44.1 KG/M2 | HEIGHT: 71 IN | WEIGHT: 315 LBS

## 2021-06-05 VITALS — WEIGHT: 315 LBS | BODY MASS INDEX: 44.1 KG/M2 | HEIGHT: 71 IN

## 2021-06-05 VITALS
DIASTOLIC BLOOD PRESSURE: 78 MMHG | HEIGHT: 71 IN | BODY MASS INDEX: 44.1 KG/M2 | HEART RATE: 60 BPM | WEIGHT: 315 LBS | SYSTOLIC BLOOD PRESSURE: 141 MMHG | OXYGEN SATURATION: 94 %

## 2021-06-07 NOTE — TELEPHONE ENCOUNTER
Onofre is calling and states that he has been heavy his entire life.  Onofre has tried nutrisystem, nutrimost and has worked.  Onofre fell and broke his pelvis and was in nursing home and lost one pound a day.  Onofre is looking for a diet plan or medication to help with losing weight.  Onofre is 380 pounds now.  Onofre is requesting to establish a primary and set up an appointment and have a discussion and a plan on how to lose weight and explore opportunities that are available to him.       Reason for Disposition    Nursing judgment or information in reference    Protocols used: NO GUIDELINE [id number]

## 2021-06-08 NOTE — PROGRESS NOTES
"Onofre Garg is a 64 y.o. male who is being evaluated via a billable telephone visit.      The patient has been notified of following:     \"This telephone visit will be conducted via a call between you and your physician/provider. We have found that certain health care needs can be provided without the need for a physical exam.  This service lets us provide the care you need with a short phone conversation.  If a prescription is necessary we can send it directly to your pharmacy.  If lab work is needed we can place an order for that and you can then stop by our lab to have the test done at a later time.    Telephone visits are billed at different rates depending on your insurance coverage. During this emergency period, for some insurers they may be billed the same as an in-person visit.  Please reach out to your insurance provider with any questions.    If during the course of the call the physician/provider feels a telephone visit is not appropriate, you will not be charged for this service.\"    Patient has given verbal consent to a Telephone visit? Yes    What phone number would you like to be contacted at? Listed home    Patient would like to receive their AVS by AVS Preference: Karlos.    Additional provider notes:  Assessment/ Plan     1. Morbid obesity (H)  Patient presents for a telephone visit today for comprehensive weight management start.  We discussed both short-term and long-term goals outlined below.  As part of his comprehensive plan, he would like to try medication.  Will start with phentermine, half a tab in the morning.  We will then have a follow-up telephone visit in 4 weeks.  Patient will check his weight weekly and write this down so we can assess his weight loss.  Reviewed potential adverse side effects with patient.  We discussed possibly adding topiramate at some point in the future.  He does have cardiomyopathy listed in his chart, but review shows that this was related to an episode of " "atrial fibrillation.  He has had an ablation and a repeat echo that was normal.  - phentermine (ADIPEX-P) 37.5 mg tablet; Take 1/2 tablet daily in the morning.  Dispense: 30 tablet; Refill: 0    2. Obstructive sleep apnea  Patient has sleep apnea and uses CPAP nightly.    3. Essential hypertension  Under good control with his current medication.    4. Hypertriglyceridemia  Currently not on any medication.      Subjective:       Onofre Garg is a 64 y.o. male who presents for a comprehensive weight loss management intake.  Patient was mailed the intake form, but he was unable to get it back to us.  He was unable to figure out his smart phone to do a video visit, so this was converted to a telephone visit.  I reviewed the entire packet with him verbally.  He states that he is wanting to lose weight due to some health problems.  He states his motivation is about a 5 out of 10.  He states is not at a 10 out of 10 because he states that he is single and he has a hard time staying motivated to make meals.  He states that he has had trouble with his weight his whole life.  He states that currently his weight is 285.  He states that his goal weight is 240 and he thinks he was there over a year ago.  He attributes about 100 pound weight gain to an accident that he had in 2015.  He fell approximately 14 feet and broke his pelvis, ankle, and leg.  He states he was in a nursing home for about 3 months and during this time he gained about 100 pounds.  He states shortly after that he had atrial fibrillation and then had a heart ablation.  He initially did have some cardiomyopathy related to the atrial fibrillation.  His repeat echocardiogram was normal.  He does have benign essential hypertension which is under good control.  His only medication is metoprolol.  He states that he has had success with different diets in the past.  He did \"Neutra most\" and lost about 40 pounds.  It took him about 4 years to regain the weight.  He " states he is also done [employer] and [employer].  He states that he tends to lose weight on those systems but then regains it and then extra weight when he stops doing the preprepared foods.  Social history: He is retired.  He used to work construction.  His hobbies include working in the yard.  He does like to ride motorcycles as well.  He is single so does all the cooking and the grocery shopping.  He does not smoke.  He does not drink any sugar sweetened beverages.  He does drink about a 12 pack of beer per week.  He does not use any street drugs.  Family history is significant for a brother with colon cancer and another brother with prostate cancer.  Past medical history significant for sleep apnea for which he uses CPap, hypertension, elevated triglycerides, elevated PSA which is being followed, surgery for gallbladder and hernia.  He denies depression or anxiety.  As far as his diet, he states typically in the morning he will have garcia, eggs, and toast or cereal.  Lately he has been having a [employer] meal replacement blended with the banana.  For lunch she will have soup or a sandwich.  For supper he will have burger, pizza, spaghetti, typically something pretty easy.  Then, once over the weekend he will make something more involved like a ham or chicken.  He states he really does not eat a lot of vegetables.  He finds them inconvenient.  He states he really does not have a lot of cravings for foods.  He states he used to but this sort of went away after he did 1 of the [employer] programs.  He states occasionally he will binge eat when he gets bored on snacks maybe a couple times per week.  He states he is trying to keep active.  Before the coronavirus pandemic, he was going to the gym at least 3 times per week.  At the gym he was doing a combination of cardio and weight training.  He states because of his previous accident, it is hard for him to walk and stand.  He does not feel comfortable walking around  his neighborhood.  He does not have any equipment at home.  He does not track his calories and states he would not do anything on his smart phone such as lloyd to track his calories.  He does have a scale and is willing to weigh himself once a week.  He does like the meal replacements and would be willing to do that twice a day.  He is very interested in trying a medication.  I did have a long discussion with him that medication alone will not cause him to lose weight, he has to do the other things that we have talked about.  We discussed long-term and short-term goals.  I discussed with him that a 10% weight loss would be reasonable over the next 4 to 6 months.  I discussed medication options with him and potential side effects.  Patient's long-term goal: Initially states he would like to lose 140 pounds, but states he be happy with a 40 pound weight loss over the next 4 to 6 months.  Short-term goals for the next month: Patient is willing to weigh himself once weekly.  Until he can get back to the gym, he will try to be more active around the house.  He will have one meal in the middle of the day and use a meal replacement in the morning and the evening.  He will try to make that meal healthy with lean protein and more vegetables.  He is not really willing to cut back on his beer at this point.  I did offer support with the dietitian, but he would like to hold off on that for now.  He is currently not interested in bariatric surgery, but would consider it possibly in the future.  He did come in and do some blood work.  No prediabetes, normal kidney and liver function.      Relevant past medical, family, surgical, and social history reviewed with patient, unless noted in HPI, not pertinent for this visit.  Medications were discussed and reconciled.   Review of Systems   A 12 point comprehensive review of systems was negative except as noted.      Current Outpatient Medications   Medication Sig Dispense Refill      metoprolol succinate (TOPROL-XL) 50 MG 24 hr tablet Take 50 mg by mouth at bedtime.       phentermine (ADIPEX-P) 37.5 mg tablet Take 1/2 tablet daily in the morning. 30 tablet 0     No current facility-administered medications for this visit.        Objective:      Wt (!) 385 lb (174.6 kg) Comment: Patient reported  BMI 54.08 kg/m      Patient was willing to come in for blood work.    Recent Results (from the past 168 hour(s))   Glycosylated Hemoglobin A1c   Result Value Ref Range    Hemoglobin A1c 5.3 3.5 - 6.0 %   Thyroid Stimulating Hormone (TSH)   Result Value Ref Range    TSH 4.18 0.30 - 5.00 uIU/mL   Vitamin D, Total (25-Hydroxy)   Result Value Ref Range    Vitamin D, Total (25-Hydroxy) 28.0 (L) 30.0 - 80.0 ng/mL   Hepatic Profile   Result Value Ref Range    Bilirubin, Total 0.4 0.0 - 1.0 mg/dL    Bilirubin, Direct 0.1 <=0.5 mg/dL    Protein, Total 6.6 6.0 - 8.0 g/dL    Albumin 3.8 3.5 - 5.0 g/dL    Alkaline Phosphatase 62 45 - 120 U/L    AST 27 0 - 40 U/L    ALT 36 0 - 45 U/L          This note has been dictated using voice recognition software. Any grammatical or context distortions are unintentional and inherent to the software       Phone call duration:  60 minutes    Juanita Thao MD

## 2021-06-08 NOTE — PROGRESS NOTES
"Onofre Garg is a 64 y.o. male who is being evaluated via a billable telephone visit.      The patient has been notified of following:     \"This telephone visit will be conducted via a call between you and your physician/provider. We have found that certain health care needs can be provided without the need for a physical exam.  This service lets us provide the care you need with a short phone conversation.  If a prescription is necessary we can send it directly to your pharmacy.  If lab work is needed we can place an order for that and you can then stop by our lab to have the test done at a later time.    Telephone visits are billed at different rates depending on your insurance coverage. During this emergency period, for some insurers they may be billed the same as an in-person visit.  Please reach out to your insurance provider with any questions.    If during the course of the call the physician/provider feels a telephone visit is not appropriate, you will not be charged for this service.\"    Patient has given verbal consent to a Telephone visit? Yes    What phone number would you like to be contacted at? [phone]    Patient would like to receive their AVS by AVS Preference: Karlos.    Additional provider notes:  Assessment/ Plan     1. Morbid obesity (H)  This is Onofre's first 4-week follow-up after initial comprehensive weight management virtual visit.  He has been on a half a tablet of phentermine 37.5 mg tablets.  He has had no adverse side effects and felt that it helped with his appetite.  He started at 394 pounds and is now 388 giving him a 6 pound weight loss over the last 4 weeks.  He would like to increase the dosage of the phentermine as he is finding it helpful and tolerating it well.  His other goals for the month are to get back to the gym on a more regular basis and continue to work on a food choices.  He does not feel like she wants to meet with a dietitian at this point.  Follow-up again in 4 " weeks.  - phentermine (ADIPEX-P) 37.5 mg tablet; Take 1 tablet (37.5 mg total) by mouth daily before breakfast.  Dispense: 30 tablet; Refill: 0      Subjective:       Onofre Garg is a 64 y.o. male who presents for a telephone encounter for follow-up of his intake for comprehensive weight management.  After our last meeting, started on half a tablet of phentermine.  His other goals were to increase his activity level at home while he has been unable to go to the gym.  He states that he has been trying to do more projects at home outdoors in his yard.  He feels like he is kept pretty active.  He was also going to use a meal replacement shakes.  He states for breakfast he has been [employer] protein shake with a banana blended in.  He feels like with the phentermine, he does not get hungry like he used to.  He then often is only having one meal later in the day.  He states he has not really made that any changes with his diet otherwise.  He admits he is not a big vegetable eater but is trying to be better about that.  After discussion of options today, he would like to increase the dose of phentermine.  His new goals for the month are to get back in the gym which opens this week.  He plans to go at least 3 times a week.  He will continue to monitor his diet and will continue with at least 1 meal replacement per day.  He has been weighing himself every few days.  Initially started at 394 pounds and was 388 pounds this morning.  He is still drinking beer regularly and has not wanted to make any changes with that at this point.  This may be something he will need to consider in the future.    Relevant past medical, family, surgical, and social history reviewed with patient, unless noted in HPI, not pertinent for this visit.  Medications were discussed and reconciled.   Review of Systems   A 12 point comprehensive review of systems was negative except as noted.      Current Outpatient Medications   Medication Sig Dispense  Refill     metoprolol succinate (TOPROL-XL) 50 MG 24 hr tablet Take 50 mg by mouth at bedtime.       phentermine (ADIPEX-P) 37.5 mg tablet Take 1 tablet (37.5 mg total) by mouth daily before breakfast. 30 tablet 0     No current facility-administered medications for this visit.        Objective:      Wt (!) 388 lb 6.4 oz (176.2 kg)   BMI 54.55 kg/m          No results found for this or any previous visit (from the past 168 hour(s)).       This note has been dictated using voice recognition software. Any grammatical or context distortions are unintentional and inherent to the software      Phone call duration:  21 minutes    Juanita Thao MD

## 2021-06-08 NOTE — TELEPHONE ENCOUNTER
Central PA team  [phone]  Pool: HE PA MED ([id number])          PA has been initiated.       PA form completed and faxed insurance via Cover My Meds     Key:  [id number]  [id number]     Medication:  Phentermine HCl 37.5MG tablets    Insurance:  [employer]        Response will be received via fax and may take up to 5-10 business days depending on plan

## 2021-06-08 NOTE — TELEPHONE ENCOUNTER
Please advise   Fax received from [facility] stating Rx for Phentermine will need a PA. Fax states to submit PA :   1) login to go[employer]  2) Key [id number],  Patient last name and

## 2021-06-09 NOTE — PROGRESS NOTES
"Onofre Garg is a 64 y.o. male who is being evaluated via a billable telephone visit.      The patient has been notified of following:     \"This telephone visit will be conducted via a call between you and your physician/provider. We have found that certain health care needs can be provided without the need for a physical exam.  This service lets us provide the care you need with a short phone conversation.  If a prescription is necessary we can send it directly to your pharmacy.  If lab work is needed we can place an order for that and you can then stop by our lab to have the test done at a later time.    Telephone visits are billed at different rates depending on your insurance coverage. During this emergency period, for some insurers they may be billed the same as an in-person visit.  Please reach out to your insurance provider with any questions.    If during the course of the call the physician/provider feels a telephone visit is not appropriate, you will not be charged for this service.\"    Patient has given verbal consent to a Telephone visit? Yes    What phone number would you like to be contacted at? [phone]    Patient would like to receive their AVS by AVS Preference: Karlos.    Additional provider notes:   Assessment/ Plan     1. Morbid obesity with BMI of 45.0-49.9, adult (H)  Onofre is following up now 8 weeks from the start of comprehensive weight management.  He initially started on a half a tablet of phentermine and then we increased it to a full tablet 4 weeks ago.  He has now lost 11.6 pounds.  We discussed would like him to be around the 5% weight loss when we talk in an additional 4 weeks.  He is still struggling with hunger, will add topiramate 25 mg at bedtime for the first week, then twice daily breakfast and dinner.  We did review potential adverse side effects.  He has been afraid to go to the gym, so we will try to get more exercise at home.  - topiramate (TOPAMAX) 25 MG tablet; Take 1 " tablet (25 mg total) by mouth 2 (two) times a day.  Dispense: 90 tablet; Refill: 3    - phentermine (ADIPEX-P) 37.5 mg tablet; Take 1 tablet (37.5 mg total) by mouth daily before breakfast.  Dispense: 30 tablet; Refill: 0      Subjective:       Onofre Garg is a 64 y.o. male who presents for weight management follow-up.  He is now 8 weeks from the initial.  We initially started on half a tab of phentermine and then increase to a full tab when we talked last week.  He feels like initially it was really helping quite a bit.  He had lost some additional weight, but gained several pounds back over the Fourth of July weekend.  He does drink beer, and was drinking more beer over the weekend.  He does not feel like that something he wants to give up at this point.  He did start going back to the gym, but does not feel that it is a very safe place to go right now.  He tries to stay active around the house, but is not doing any specific regular exercise.  We brainstormed some ideas such as going for a walk every morning for 15 to 20 minutes.  He is basically eating 2 meals right now.  He will eat a shake or breakfast in the morning and then he is eating dinner kind of late between 7 and 730.  Sometimes he is over hungry at dinner and tends to overeat.  We discussed adding an afternoon snack or an shake so that he is not quite so hungry in the evening.  He is a little bit frustrated as he was hoping to be a little bit lighter.  His weights have been as follow: 394, week 4: 388, week 8: 382.4.  He would like to try adding an additional medication if possible.  We discussed topiramate.  He really does not drink a lot of soda.  I reviewed potential adverse side effects with him.  We will start fairly low at 25 mg at bedtime and then increase to twice daily we will recheck again in 4 weeks and see how things are going.      Relevant past medical, family, surgical, and social history reviewed with patient, unless noted in HPI, not  pertinent for this visit.  Medications were discussed and reconciled.   Review of Systems   A 12 point comprehensive review of systems was negative except as noted.      Current Outpatient Medications   Medication Sig Dispense Refill     metoprolol succinate (TOPROL-XL) 50 MG 24 hr tablet Take 50 mg by mouth at bedtime.       phentermine (ADIPEX-P) 37.5 mg tablet Take 1 tablet (37.5 mg total) by mouth daily before breakfast. 30 tablet 0     vit A/vit C/vit E/zinc/copper (PRESERVISION AREDS ORAL) Take by mouth.       topiramate (TOPAMAX) 25 MG tablet Take 1 tablet (25 mg total) by mouth 2 (two) times a day. 90 tablet 3     No current facility-administered medications for this visit.        Objective:      There were no vitals taken for this visit.    Unable due to telephone encounter.           No results found for this or any previous visit (from the past 168 hour(s)).       This note has been dictated using voice recognition software. Any grammatical or context distortions are unintentional and inherent to the software      Phone call duration:  25 minutes    Tamiko Shannon CMA

## 2021-06-10 NOTE — PROGRESS NOTES
"Onofre Garg is a 64 y.o. male who is being evaluated via a billable telephone visit.      The patient has been notified of following:     \"This telephone visit will be conducted via a call between you and your physician/provider. We have found that certain health care needs can be provided without the need for a physical exam.  This service lets us provide the care you need with a short phone conversation.  If a prescription is necessary we can send it directly to your pharmacy.  If lab work is needed we can place an order for that and you can then stop by our lab to have the test done at a later time.    Telephone visits are billed at different rates depending on your insurance coverage. During this emergency period, for some insurers they may be billed the same as an in-person visit.  Please reach out to your insurance provider with any questions.    If during the course of the call the physician/provider feels a telephone visit is not appropriate, you will not be charged for this service.\"    Patient has given verbal consent to a Telephone visit? Yes    What phone number would you like to be contacted at? [phone]    Patient would like to receive their AVS by AVS Preference: Karlos.    Additional provider notes:  Assessment/ Plan     1. Morbid obesity (H)  Onofre is now 12 weeks from the start of comprehensive weight management.  He has been on a full tablet of phentermine for 8 weeks with the addition of topiramate 25 mg twice daily for the last 4 weeks.  He has lost a total of 15 pounds over this time period.  While this does not Rose Bud goal of 10% weight loss by this time, he is extremely happy with how things are going and would like to stay on the current medication.  He feels like it is controlling his appetite.  This is been the most successful with any type of weight loss program and he is encouraged that he can stay with it.  We discussed the option of increasing the topiramate and he will increase " to 2 tablets in the morning and 1 at night.  We will plan to follow-up in 1 month.  I again discussed having him see a dietitian, but he would like to hold off at this time.  - phentermine (ADIPEX-P) 37.5 mg tablet; Take 1 tablet (37.5 mg total) by mouth daily before breakfast.  Dispense: 30 tablet; Refill: 0  - topiramate (TOPAMAX) 25 MG tablet; Take 2 tabs in am and 1 tab pm  Dispense: 90 tablet; Refill: 3      Subjective:       Onofre Garg is a 64 y.o. male who presents for follow-up of weight management.  His initial weight 12 weeks ago was 394.  At the 4-week visit he was 388.  After 8-week visit he was 382.4.  Today was 379.  We had discussed prior that in order to stay on until.,  He has to show that he is having good success with it.  I will have him hold that he will have a possible bit more weight by mouth, he is extremely happy with how it is going.  He has had 2 decrease his belt size and feels that his clothes are fitting better.  He feels like the medication is helping with his appetite.  He still has not been back to the gym as he is afraid of the virus.  He is trying to stay active at home.  He is eating fairly healthy.  For example this morning he had Cheerios and then for dinner of hamburger with no bond and a big potato.  He still has been challenged with adding fruits and vegetables to his diet.  He also is still drinking quite a bit of beer.  He states with the addition of the topiramate, he feels that he does not crave it quite as much as he used to.  He does not feel that he would benefit at this time from seeing a dietitian.  He is doing happy and feels like this is the most successful he has been.  He is wanting to stay on the medication.    Relevant past medical, family, surgical, and social history reviewed with patient, unless noted in HPI, not pertinent for this visit.  Medications were discussed and reconciled.   Review of Systems   A 12 point comprehensive review of systems was  negative except as noted.      Current Outpatient Medications   Medication Sig Dispense Refill     metoprolol succinate (TOPROL-XL) 50 MG 24 hr tablet Take 50 mg by mouth at bedtime.       phentermine (ADIPEX-P) 37.5 mg tablet Take 1 tablet (37.5 mg total) by mouth daily before breakfast. 30 tablet 0     topiramate (TOPAMAX) 25 MG tablet Take 2 tabs in am and 1 tab pm 90 tablet 3     vit A/vit C/vit E/zinc/copper (PRESERVISION AREDS ORAL) Take by mouth.       No current facility-administered medications for this visit.        Objective:      There were no vitals taken for this visit.    Reported weight 379      No results found for this or any previous visit (from the past 168 hour(s)).       This note has been dictated using voice recognition software. Any grammatical or context distortions are unintentional and inherent to the software    Phone call duration:  18 minutes    Tamiko Shannon, CMA

## 2021-06-11 NOTE — PROGRESS NOTES
"Onofre Garg is a 65 y.o. male who is being evaluated via a billable telephone visit.      The patient has been notified of following:     \"This telephone visit will be conducted via a call between you and your physician/provider. We have found that certain health care needs can be provided without the need for a physical exam.  This service lets us provide the care you need with a short phone conversation.  If a prescription is necessary we can send it directly to your pharmacy.  If lab work is needed we can place an order for that and you can then stop by our lab to have the test done at a later time.    Telephone visits are billed at different rates depending on your insurance coverage. During this emergency period, for some insurers they may be billed the same as an in-person visit.  Please reach out to your insurance provider with any questions.    If during the course of the call the physician/provider feels a telephone visit is not appropriate, you will not be charged for this service.\"    Patient has given verbal consent to a Telephone visit? Yes    What phone number would you like to be contacted at? [phone]    Patient would like to receive their AVS by AVS Preference: Karlos.    Additional provider notes:   Assessment/ Plan     1. Morbid obesity with BMI of 45.0-49.9, adult (H)  Onofre is doing a telephone encounter today to follow-up of comprehensive weight management.  He is now 20 weeks from the start of our intake.  He is taking a full tab of phentermine 37 point 5 in the morning and topiramate 25 mg, 2 tabs twice daily.  He has had slow progress with this with a total of 24 pound weight loss which translates into 6% of body mass.  He has only lost about 20 pounds since I talked to him last month.  He feels that he would really like to stay on the phentermine, even though he has not specifically met criteria for efficacy yet.  He feels like it is really helping with his appetite.  He is planning on " "going back to the gym in the next 1 month and will try to not have as many \"unhealthy days.  He will plan to come in person next month so we can check his blood pressure, pulse, and do an exam.  If he is not having much success over the next 1 month, he agrees to see the dietitian.      Subjective:       Onofre Garg is a 65 y.o. male who presents for follow-up of comprehensive weight management.  He has lost 2 and half pounds since I talked to him last.  He states he has had 2 put an extra hole in his belt loop over the last 1 month.  He thinks he would have done better, but he was sick for about 4 to 5 days.  Then he states he has not been exercising as much as the weather has been bad.  He has been afraid to go to the gym but he thinks he is going to start getting back into that and I think that is a good plan going forward with winter.  He has had a lot of stressors with his father going into a nursing home.  He is really happy with the amount of weight he has lost.  He says this is been the best success that he has had.  He would really like to stay on the medication.  He is not having any adverse side effects.  He states that he has been dieting most of his life and he knows when he does something wrong, just a matter of motivation.  Start: 394  4 week: 388  8 week: 382.4  12 week: 379  16 week: 372.9  20 week: 370.2    Relevant past medical, family, surgical, and social history reviewed with patient, unless noted in HPI, not pertinent for this visit.  Medications were discussed and reconciled.   Review of Systems   A 12 point comprehensive review of systems was negative except as noted.      Current Outpatient Medications   Medication Sig Dispense Refill     metoprolol succinate (TOPROL-XL) 50 MG 24 hr tablet Take 50 mg by mouth at bedtime.       phentermine (ADIPEX-P) 37.5 mg tablet Take 1 tablet (37.5 mg total) by mouth daily before breakfast. 30 tablet 0     topiramate (TOPAMAX) 25 MG tablet Take 2 tabs in " am and 2 tab pm 120 tablet 3     vit A/vit C/vit E/zinc/copper (PRESERVISION AREDS ORAL) Take by mouth.       No current facility-administered medications for this visit.        Objective:      There were no vitals taken for this visit.      Phone call duration:  15   minutes    Eva Ashford LPN

## 2021-06-11 NOTE — PROGRESS NOTES
"Onofre Garg is a 65 y.o. male who is being evaluated via a billable telephone visit.      The patient has been notified of following:     \"This telephone visit will be conducted via a call between you and your physician/provider. We have found that certain health care needs can be provided without the need for a physical exam.  This service lets us provide the care you need with a short phone conversation.  If a prescription is necessary we can send it directly to your pharmacy.  If lab work is needed we can place an order for that and you can then stop by our lab to have the test done at a later time.    Telephone visits are billed at different rates depending on your insurance coverage. During this emergency period, for some insurers they may be billed the same as an in-person visit.  Please reach out to your insurance provider with any questions.    If during the course of the call the physician/provider feels a telephone visit is not appropriate, you will not be charged for this service.\"    Patient has given verbal consent to a Telephone visit? Yes    What phone number would you like to be contacted at? [phone]    Patient would like to receive their AVS by AVS Preference: Karlos.    Additional provider notes:  Assessment/ Plan     1. Morbid obesity with BMI of 45.0-49.9, adult (H)  Onofre is doing a telephone encounter for weight management follow-up.  He is now 16 weeks from the start of the program.  He is using phentermine and topiramate 25 mg 2 tabs twice daily.  He is tolerating the medication well and is happy with his weight loss.  He is now lost a total of 22 pounds or approximately 5% of his body weight.  He would like to continue current medications and stay on the program.  He has not had any sticking points this last month.  We will follow-up virtually in 1 month.  - topiramate (TOPAMAX) 25 MG tablet; Take 2 tabs in am and 2 tab pm  Dispense: 120 tablet; Refill: 3  - phentermine (ADIPEX-P) 37.5 " mg tablet; Take 1 tablet (37.5 mg total) by mouth daily before breakfast.  Dispense: 30 tablet; Refill: 0      Subjective:       Onofre Garg is a 65 y.o. male who presents for follow-up comprehensive weight management.  Is now been 16 weeks that he has been following the program.  He has been very happy with his results.  He states this is the most successful he is ever been with his weight loss.  He is having more energy and overall feels better.  He did have a couple episodes when he was out in the heat where he felt a little lightheaded and had to lay down.  He also feels that he has a little bit of breast tenderness.  He has lost some weight in that area and is not sure if that is why he is noticing it.  He has not felt any lumps or bumps.  He has Cut way back on his beer consumption and has been helpful.  At this point, he is mainly eating 2 meals a day.  He will eat breakfast and then he will eat dinner in the early evening.  He is not having any cravings like he used to and he does not feel like he is snacking or overeating.  He is weighing himself daily which she finds motivating.  His weight loss has been as follows:  Start: 394  4 weeks: 388  8 weeks: 382.4  12 weeks: 379  16 weeks 372.9.        Relevant past medical, family, surgical, and social history reviewed with patient, unless noted in HPI, not pertinent for this visit.  Medications were discussed and reconciled.   Review of Systems   A 12 point comprehensive review of systems was negative except as noted.      Current Outpatient Medications   Medication Sig Dispense Refill     metoprolol succinate (TOPROL-XL) 50 MG 24 hr tablet Take 50 mg by mouth at bedtime.       phentermine (ADIPEX-P) 37.5 mg tablet Take 1 tablet (37.5 mg total) by mouth daily before breakfast. 30 tablet 0     topiramate (TOPAMAX) 25 MG tablet Take 2 tabs in am and 2 tab pm 120 tablet 3     vit A/vit C/vit E/zinc/copper (PRESERVISION AREDS ORAL) Take by mouth.       No current  facility-administered medications for this visit.        Objective:      There were no vitals taken for this visit.    Unable due to telephone encounter.      No results found for this or any previous visit (from the past 168 hour(s)).       Phone call duration:  15 minutes    Eva Ashford LPN

## 2021-06-12 NOTE — PROGRESS NOTES
"Onofre Garg is a 65 y.o. male who is being evaluated via a billable telephone visit.      The patient has been notified of following:     \"This telephone visit will be conducted via a call between you and your physician/provider. We have found that certain health care needs can be provided without the need for a physical exam.  This service lets us provide the care you need with a short phone conversation.  If a prescription is necessary we can send it directly to your pharmacy.  If lab work is needed we can place an order for that and you can then stop by our lab to have the test done at a later time.    Telephone visits are billed at different rates depending on your insurance coverage. During this emergency period, for some insurers they may be billed the same as an in-person visit.  Please reach out to your insurance provider with any questions.    If during the course of the call the physician/provider feels a telephone visit is not appropriate, you will not be charged for this service.\"    Patient has given verbal consent to a Telephone visit? Yes    What phone number would you like to be contacted at? [phone]    Patient would like to receive their AVS by AVS Preference: Karlos.    Additional provider notes:  Assessment/ Plan     1. Morbid obesity with BMI of 45.0-49.9, adult (H)  Onofre is doing a telephone encounter today for follow-up of comprehensive weight management.  He has struggled in the last month with having a medical issue where he could not be active for a week and then a mixup with his prescription so he has been off the phentermine for a week.  When looking back over the last 8 weeks, he is only lost a total of a pound and a half.  I discussed with him again that with the phentermine, we need to see better results to justify staying on it.  He would really like to give it another month and I agreed on the condition that he sees a dietitian.  I think that there is probably room to move with " what he is doing from a nutrition standpoint.  Follow-up in 1 month.  - Amb Referral to Bariatric Dietician    2. Obstructive sleep apnea    3. Essential hypertension        Subjective:       Onofre Garg is a 65 y.o. male who presents for comprehensive weight management follow-up.  He has gained a pound since I last saw him.  He states that he had a rectal abscess that required surgical intervention and he was unable to exercise to be active for about a week.  He then had a mixup with his refill of his phentermine so had been off of it for a week.  He states he had been down to 368 before he ran out of his prescription of phentermine.  He has been happy with his success and feels like he is doing fairly well.  I reviewed with him his numbers over the last 24 weeks.  He has lost a total of 23 pounds, but has plateaued over the last 8 weeks.  He is willing now to talked with a dietitian and I think that would be helpful to take a closer look at his nutrition and see if some changes can be made.    Relevant past medical, family, surgical, and social history reviewed with patient, unless noted in HPI, not pertinent for this visit.  Medications were discussed and reconciled.   Review of Systems   A 12 point comprehensive review of systems was negative except as noted.      Current Outpatient Medications   Medication Sig Dispense Refill     metoprolol succinate (TOPROL-XL) 50 MG 24 hr tablet Take 50 mg by mouth at bedtime.       multivitamin (MULTI-DAY ORAL) Take by mouth.       vit A/vit C/vit E/zinc/copper (PRESERVISION AREDS ORAL) Take by mouth.       phentermine (ADIPEX-P) 37.5 mg tablet Take 1 tablet (37.5 mg total) by mouth daily before breakfast. 30 tablet 0     topiramate (TOPAMAX) 25 MG tablet Take 2 tabs in am and 2 tab pm 120 tablet 3     No current facility-administered medications for this visit.        Objective:      There were no vitals taken for this visit.    Unable due to telephone visit    No results  found for this or any previous visit (from the past 168 hour(s)).       This note has been dictated using voice recognition software. Any grammatical or context distortions are unintentional and inherent to the software      Phone call duration:  15 minutes    Tamiko Shannon, CMA

## 2021-06-12 NOTE — TELEPHONE ENCOUNTER
Controlled Substance Refill Request  Medication Name:   Requested Prescriptions     Pending Prescriptions Disp Refills     phentermine (ADIPEX-P) 37.5 mg tablet 30 tablet 0     Sig: Take 1 tablet (37.5 mg total) by mouth daily before breakfast.     Date Last Fill: 9/3/2020  Requested Pharmacy: [id number], [facility]  Submit electronically to pharmacy  Controlled Substance Agreement on file:   Encounter-Level CSA Scan Date:    There are no encounter-level csa scan date.        Last office visit:  10/1/2020

## 2021-06-12 NOTE — PROGRESS NOTES
"   Onofre Garg is a 65 y.o. male who is being evaluated via a billable telephone visit.      The patient has been notified of following:     \"This telephone visit will be conducted via a call between you and your physician/provider. We have found that certain health care needs can be provided without the need for a physical exam.  This service lets us provide the care you need with a short phone conversation.  If a prescription is necessary we can send it directly to your pharmacy.  If lab work is needed we can place an order for that and you can then stop by our lab to have the test done at a later time.    Telephone visits are billed at different rates depending on your insurance coverage. During this emergency period, for some insurers they may be billed the same as an in-person visit.  Please reach out to your insurance provider with any questions.    If during the course of the call the physician/provider feels a telephone visit is not appropriate, you will not be charged for this service.\"    Patient has given verbal consent to a Telephone visit? Yes    What phone number would you like to be contacted at? [phone]    Patient would like to receive their AVS by AVS Preference: Karlos.    Additional provider notes:     Medical  Weight Loss Initial Diet Evaluation  Onofre is presenting today for a new weight management nutrition consultation. Pt has had an initial appointment with Dr. Thao.  Weight loss medication: Phentermine. Topamax  Nutrition Assessment:   Anthropometrics:  Pt's Initial Weight: 393 lbs  Weight: (!) 371 lb (168.3 kg) (patient reported)  Weight loss from initial: 22  % Weight loss: 5.6 %    BMI: Body mass index is 51.74 kg/m .   Ideal body weight: 75.3 kg (166 lb 0.1 oz)  Adjusted ideal body weight: 112.5 kg (248 lb 0.1 oz)  Estimated RMR (Swifton-St Jeor equation):  2503 kcals x 1.2 (sedentary) = 3004 kcals (for weight maintenance)    Recommended Protein Intake: 120-130 grams of " protein/day  Medical History:  Patient Active Problem List   Diagnosis     Typical atrial flutter (H)     Morbid obesity with BMI of 45.0-49.9, adult (H)     Cardiomyopathy (H)     Umbilical hernia without obstruction and without gangrene     Atrial fibrillation and flutter (H)     Erectile dysfunction     Essential hypertension     Morbid obesity (H)     Obstructive sleep apnea     Epidermoid cyst of skin     Sleep apnea      Diabetes: No  Nutrition History:   Food allergies/intolerances/cultural or religous food customs: does not like beets, allergic to seafood  Vitamins/Mineral Supplementation: multivitamin  Dietary Recall:  Breakfast: cereal  Lunch: soup or [employer]  Dinner: Hamburger; went out to eat last night - ordered chimichanga with rice and beans  Beverages:   Water: 6-7 bottles  Milk: Skim or 1%, 16 oz glass 3x per   Alcohol: Beer 1-2x per week  Exercise: Limited exercise in the past week due to rectal abscess that required surgical intervention.  Nutrition Diagnosis (PES statement):   Overweight/Obesity (NC 3.3) related to excessive calorie intake as evidenced by BMI 51.74  Nutrition Intervention:  1. Food and/or Nutrient Delivery   a. Placed emphasis on importance of developing a healthy meal routine, aiming for 3 meals a day and no snacks.  2. Nutrition Education   a. Discussed with patient how to build a meal: the importance of including a lean/low fat protein at each meal, include a source of vegetables at a minimum of lunch and dinner and limiting carbohydrate intake to less than 1/4 of meal.  b. Educated on sources of lean protein, portion sizes, the amount of grams found in each source. Recommend patient to aim for 30-40g protein at each meal.  c. Discussed the importance of adequate hydration, with emphasis on drinking 64oz of water or zero calorie beverages per day.  3. Nutrition Counseling   a. Encouraged importance of developing routine exercise for health benefits and weight loss.    Goals  established by patient:   1. Cut back milk intake to 8 oz of skim milk per meal or less  2. Aim to have at least a fruit and/or vegetable serving each day  3. Have a lean protein source at each meal, aim for 6-8 oz protein  Assessment/Plan:    Pt will follow up in 1 month(s) with dietitian.     Phone call duration: 30 minutes    La Mejia RD

## 2021-06-13 NOTE — TELEPHONE ENCOUNTER
Controlled Substance Refill Request  Medication Name:   Requested Prescriptions     Pending Prescriptions Disp Refills     phentermine (ADIPEX-P) 37.5 mg tablet [Pharmacy Med Name: PHENTERMINE 37.5MG TABLET] 30 tablet 0     Sig: TAKE 1 TABLET BY MOUTH BEFORE BREAKFAST     Date Last Fill: 11/17/20  Requested Pharmacy: [facility]  Submit electronically to pharmacy  Controlled Substance Agreement on file:   Encounter-Level CSA Scan Date:    There are no encounter-level csa scan date.        Last office visit:  11/4/20  Kimmie Kellogg RN, MA  [facility]    Triage Nurse Advisor

## 2021-06-13 NOTE — PROGRESS NOTES
"   Onofre Garg is a 65 y.o. male who is being evaluated via a billable telephone visit.      The patient has been notified of following:     \"This telephone visit will be conducted via a call between you and your physician/provider. We have found that certain health care needs can be provided without the need for a physical exam.  This service lets us provide the care you need with a short phone conversation.  If a prescription is necessary we can send it directly to your pharmacy.  If lab work is needed we can place an order for that and you can then stop by our lab to have the test done at a later time.    Telephone visits are billed at different rates depending on your insurance coverage. During this emergency period, for some insurers they may be billed the same as an in-person visit.  Please reach out to your insurance provider with any questions.    If during the course of the call the physician/provider feels a telephone visit is not appropriate, you will not be charged for this service.\"    Patient has given verbal consent to a Telephone visit? Yes    Patient would like to receive their AVS by AVS Preference: Karlos.    Additional provider notes:      Medical  Weight Loss Follow-Up Diet Evaluation  Assessment:  Onofre is presenting today for a follow up weight management nutrition consultation. Pt has had an initial appointment with Dr. Thao  Weight loss medication: Phentermine and Topiramate.   Pt's Initial Weight: 393 lbs  Weight: (!) 371 lb (168.3 kg)  Weight loss from initial: 22  % Weight loss: 5.6 %    BMI: Body mass index is 51.74 kg/m .  Ideal body weight: 75.3 kg (166 lb 0.1 oz)  Adjusted ideal body weight: 112.5 kg (248 lb 0.1 oz)    2503 kcals x 1.2 (sedentary) = 3004 kcals (for weight maintenance)    Recommended Protein Intake: 120 grams of protein/day  Patient Active Problem List:  Patient Active Problem List   Diagnosis     Typical atrial flutter (H)     Morbid obesity with BMI of 45.0-49.9, " adult (H)     Cardiomyopathy (H)     Umbilical hernia without obstruction and without gangrene     Atrial fibrillation and flutter (H)     Erectile dysfunction     Essential hypertension     Morbid obesity (H)     Obstructive sleep apnea     Epidermoid cyst of skin     Sleep apnea     Diabetes: No    Progress on goals from last visit:   Patient reports overall things are going well   1. Cut back milk intake to 8 oz of skim milk per meal or less - not met  2. Aim to have at least a fruit and/or vegetable serving each day - not met  3. Have a lean protein source at each meal, aim for 6-8 oz protein - not met    Dietary Recall:  Usually 2 meals   Breakfast: 2 bananas and glass of milk, orange juice  Lunch: None  Dinner: two grilled cheese sandwiches  Evening snack: dish of ice cream  Typical snacks: Ice cream in the evening (not every evening); popcorn  Eating out: 2x per month  Beverages:   Water: 5-6 bottles  1-2 per week of diet soda  Milk: Skim or 1%, 16 oz glass at meals  Alcohol: Beer 6-12x per week (usually drinks several beers 1 night per week), Michelob light  Exercise:   ADLs, has been doing a lot of yard work and cutting down trees  Nutrition Diagnosis:    Overweight/Obesity (NC 3.3) related to excessive calorie intake as evidenced by BMI 51.74    Intervention:  1. Food and/or nutrient delivery: Discussed current intake and progress toward goals  2. Nutrition counseling: Discussed how weight can fluctuate and what to expect with weight trends    Monitoring/Evaluation:    Goals:  1. Cut back milk intake to 8 oz of skim milk per meal or less  2. Aim to have at least a fruit and/or vegetable serving each day  3. Have a lean protein source at each meal, aim for 6-8 oz protein  4. Aim to eat 3 meals per day    Patient to follow up in 1 month(s) with RD    Phone call duration: 28 minutes    La Mejia RD

## 2021-06-13 NOTE — TELEPHONE ENCOUNTER
Central PA team  [phone]  Pool: HE PA MED ([id number])          PA has been initiated.       PA form completed and faxed insurance via Cover My Meds     Key:  [id number]     Medication:  Phentermine HCI 37.5 mg tablets    Insurance:  [employer]        Response will be received via fax and may take up to 5-10 business days depending on plan

## 2021-06-13 NOTE — TELEPHONE ENCOUNTER
Controlled Substance Refill Request  Medication Name:   Requested Prescriptions     Pending Prescriptions Disp Refills     phentermine (ADIPEX-P) 37.5 mg tablet [Pharmacy Med Name: PHENTERMINE 37.5MG TABLET] 30 tablet 0     Sig: TAKE 1 TABLET BY MOUTH DAILY BEFORE BREAKFAST     Date Last Fill:  10/26/2020  Is patient out of medication?: N/A - electronic request  Patient notified refills processed within 3 business days:  Yes  Requested Pharmacy: [id number] [place], [place]  Submit electronically to pharmacy  Controlled Substance Agreement on file:   Encounter-Level CSA Scan Date:    There are no encounter-level csa scan date.        Last office visit:   11/04/2020 with PCP

## 2021-06-13 NOTE — TELEPHONE ENCOUNTER
Please initiate prior authorization for:  Phentermine HCI 37.5 mg tablets    Key: [id number]    Thanks

## 2021-06-14 NOTE — ANESTHESIA POSTPROCEDURE EVALUATION
Patient: Onofre Garg  UMBILICAL HERNIA REPAIR WITH MESH, EXCISION OF CYST LEFT ARM  Anesthesia type: MAC    Patient location: PACU  Last vitals:   Vitals:    12/07/17 1551   BP: 172/86   Pulse: (!) 58   Resp:    Temp:    SpO2: 97%     Post vital signs: stable  Level of consciousness: awake and responds to simple questions  Post-anesthesia pain: pain controlled  Post-anesthesia nausea and vomiting: no  Pulmonary: unassisted, return to baseline  Cardiovascular: stable and blood pressure at baseline  Hydration: adequate  Anesthetic events: no    QCDR Measures:  ASA# 11 - Stephanie-op Cardiac Arrest: ASA11B - Patient did NOT experience unanticipated cardiac arrest  ASA# 12 - Stephanie-op Mortality Rate: ASA12B - Patient did NOT die  ASA# 13 - PACU Re-Intubation Rate: ASA13B - Patient did NOT require a new airway mgmt  ASA# 10 - Composite Anes Safety: ASA10A - No serious adverse event       Additional Notes:

## 2021-06-14 NOTE — TELEPHONE ENCOUNTER
Upcoming Appointment Question  When is the appointment: 1/19/21  What is your appointment for?: WEIGHT LOSS FOLLOW UP  Who is your appointment scheduled with?: Juanita Thao MD  What is your question/concern?: Patient would like to know if they can also receive a shingles vaccine at this appointment? Please call the patient back to confirm if they can receive this vaccine or not, thank you.  Okay to leave a detailed message?: Yes

## 2021-06-14 NOTE — ANESTHESIA PREPROCEDURE EVALUATION
Anesthesia Evaluation      Patient summary reviewed     Airway   Mallampati: II  Neck ROM: full   Pulmonary    (+) sleep apnea on no CPAP, severe, decreased breath sounds,                          Cardiovascular - normal exam  (+) hypertension, dysrhythmias, cardiomyopathy,     ECG reviewed     ROS comment: EF 55%  Concentric LVH  Hx atrial flutter and atrial fib  Had ablation     Neuro/Psych      Endo/Other - negative ROS      GI/Hepatic/Renal - negative ROS      Other findings: BMI 48.46      Dental - normal exam                        Anesthesia Plan  Planned anesthetic: MAC  Endo prn  ASA 3     Anesthetic plan and risks discussed with: patient    Post-op plan: routine recovery

## 2021-06-14 NOTE — ANESTHESIA CARE TRANSFER NOTE
Last vitals:   Vitals:    12/07/17 1448   BP: 130/78   Pulse: 60   Resp: 18   Temp: 36.3  C (97.4  F)   SpO2: (!) 89%     Patient's level of consciousness is drowsy  Spontaneous respirations: yes  Maintains airway independently: yes  Dentition unchanged: yes  Oropharynx: oropharynx clear of all foreign objects    QCDR Measures:  ASA# 20 - Surgical Safety Checklist: WHO surgical safety checklist completed prior to induction  PQRS# 430 - Adult PONV Prevention: 4558F - Pt received => 2 anti-emetic agents (different classes) preop & intraop  ASA# 8 - Peds PONV Prevention: NA - Not pediatric patient, not GA or 2 or more risk factors NOT present  PQRS# 424 - Stephanie-op Temp Management: 4559F - At least one body temp DOCUMENTED => 35.5C or 95.9F within required timeframe  PQRS# 426 - PACU Transfer Protocol: - Transfer of care checklist used  ASA# 14 - Acute Post-op Pain: ASA14B - Patient did NOT experience pain >= 7 out of 10

## 2021-06-14 NOTE — TELEPHONE ENCOUNTER
Per Dr. Master hayes for shingles vaccine while in clinic.   Called pt, notified of notes per MD.  Pt agrees.   Closing enc.

## 2021-06-14 NOTE — PROGRESS NOTES
HPI:  This is a 62 y.o. male here today with concerns of pain and bulging in his umbilicus area. He has noted this for the past few month(s). The symptoms have progressed and increased over this time. He also  He comes in for evaluation secondary to the hernia causing enough issues to bother them with daily activities or chores.also has a cyst on his left arm    Allergies:Other drug allergy (see comments); Percocet [oxycodone-acetaminophen]; and Unable to assess    Past Medical History:   Diagnosis Date     Morbid obesity with BMI of 45.0-49.9, adult      Nonocclusive coronary atherosclerosis of native coronary artery 06/29/2016    minimal disease per cor angio report of Dr. Estrada     BEVERLEY (obstructive sleep apnea), severe, did not tolerate CPAP     uses an oral positioner and states he sleeps better     Pelvic fracture     he retired from work after this       Past Surgical History:   Procedure Laterality Date     LAPAROSCOPIC CHOLECYSTECTOMY      [facility]     PELVIC FRACTURE SURGERY       RFA of isthmus for typical atrial flutter  07/07/2016    by Dr. Blake     TIBIA FRACTURE SURGERY Right     leg and ankle       CURRENT MEDS:      Family History   Problem Relation Age of Onset     Acute Myocardial Infarction Mother      with [facility]     Acute Myocardial Infarction Brother      Coronary Stenting Brother      Colon cancer Brother      No Medical Problems Daughter      No Medical Problems Daughter      Alcoholism Brother         reports that he has never smoked. He has never used smokeless tobacco. He reports that he drinks about 3.6 oz of alcohol per week  He reports that he does not use illicit drugs.    Review of Systems - Negative except symptomatic umbilical bulge , cyst on left arm. Otherwise twelve system of review is negative.      Vitals:    11/17/17 1143   BP: 138/70   Patient Site: Right Arm   Patient Position: Sitting   Cuff Size: Adult Large   Pulse: 60   SpO2: 94%   Weight: (!) 347 lb 1.6 oz (157.4  "kg)   Height: 5' 10.75\" (1.797 m)       Body mass index is 48.75 kg/(m^2).    EXAM:  General: NAD   HEENT: normocephalic, PERRLA and EOMS intact  Mounth: Mucus membranes moist  Neck: Supple  Chest: Clear to auscultation bilaterally  CV: RRR  ABD: Soft nontender and nondistended, umbilical hernia, reducible  EXT: Warm, pulses intact, left arm cyst 2-3 cm diameter  Neuro: Alert and oriented x3  Back: no CVA tenderness      Assessment/Plan: Pt with a umbilical hernia. I discussed this at length with He.  I went over conservative management as well as surgical treatment of this.. I would plan on doing this via anopen  approach with possible use of mesh. I went over the small risks of surgery including but not limited to bleeding and infection, anesthesia, recurrence rates and nerve injury. Also discussed the cyst excision from his arm at the same time.  I discussed the expected recovery time as well. Will get this scheduled. He will contact us to have this scheduled.      Austin Hoffman MD  [facility] Department of Surgery  "

## 2021-06-14 NOTE — PROGRESS NOTES
"    Onofre Garg is a 65 y.o. male who is being evaluated via a billable telephone visit.      The patient has been notified of following:     \"This telephone visit will be conducted via a call between you and your physician/provider. We have found that certain health care needs can be provided without the need for a physical exam.  This service lets us provide the care you need with a short phone conversation.  If a prescription is necessary we can send it directly to your pharmacy.  If lab work is needed we can place an order for that and you can then stop by our lab to have the test done at a later time.    Telephone visits are billed at different rates depending on your insurance coverage. During this emergency period, for some insurers they may be billed the same as an in-person visit.  Please reach out to your insurance provider with any questions.    If during the course of the call the physician/provider feels a telephone visit is not appropriate, you will not be charged for this service.\"    Patient has given verbal consent to a Telephone visit? Yes    Patient would like to receive their AVS by AVS Preference: Karlos.        Medical  Weight Loss Follow-Up Diet Evaluation  Assessment:  Onofre is presenting today for a follow up weight management nutrition consultation. Pt has had an initial appointment with Dr. Thao  Weight loss medication: none. Phentermine and topamax were stopped due to weight stall.  Pt's Initial Weight: 393 lbs  Weight: (!) 375 lb (170.1 kg)  Weight loss from initial: 18  % Weight loss: 4.58 %    BMI: Body mass index is 52.3 kg/m .  Ideal body weight: 75.3 kg (166 lb 0.1 oz)  Adjusted ideal body weight: 113.2 kg (249 lb 9.7 oz)    2503 kcals x 1.2 (sedentary) = 3004 kcals (for weight maintenance)     Recommended Protein Intake: 100-120 grams of protein/day  Patient Active Problem List:  Patient Active Problem List   Diagnosis     Typical atrial flutter (H)     Class 3 severe obesity " with serious comorbidity and body mass index (BMI) of 50.0 to 59.9 in adult (H)     Cardiomyopathy (H)     Umbilical hernia without obstruction and without gangrene     Atrial fibrillation and flutter (H)     Erectile dysfunction     Essential hypertension     Morbid obesity (H)     Obstructive sleep apnea     Epidermoid cyst of skin     Sleep apnea     Diabetes: No    Progress on goals from last visit: Patient states that things have been going okay for him. He is off the weight loss medicines and has been maintaining weight overall, he is up a couple pounds from his uriel weight.  1. Cut back milk intake to 8 oz of skim milk per meal or less - not met  2. Aim to have at least a fruit and/or vegetable serving each day - not met  3. Have a lean protein source at each meal, aim for 6-8 oz protein - not met  4. Aim to eat 3 meals per day - not met    Dietary Recall:  Breakfast: Couple pieces of toast, glass of milk  Lunch: None  Dinner: Grilled Cheese, glass of milk  Beverages:   Water  Two Glasses of milk  Exercise:   Has gotten a bowflex from his sister, he is waiting on a part and will start to use it soon.   Stationary bike, 3-4 days, 30 minutes  Nutrition Diagnosis:    Overweight/Obesity (NC 3.3) related to excessive calorie intake as evidenced by BMI 52.    Intervention:  1. Food and/or nutrient delivery: 3 meals per day with a lean protein source.  1. Nutrition counseling: Reviewed patient's current progress toward goals    Monitoring/Evaluation:    Goals:  1. Cut back milk intake to 8 oz of skim milk per meal or less  2. Aim to have at least a fruit and/or vegetable serving each day  3. Have a lean protein source at each meal, aim for 6-8 oz protein  4. Aim to eat 3 meals per day    Patient to follow up as needed per his request.       Phone call time: 9 minutes  La Mejia RD

## 2021-06-14 NOTE — PROGRESS NOTES
Return Medical Weight Management Note     Onofre Garg  MRN:  [id number]  :  1955  CAIN:  2021    No flowsheet data found.    INTERVAL HISTORY:  Onofre presents for comprehensive weight management.  This is actually for semimeeting him as we are doing all virtual visits.  He established with me back in May 2020 with an initial weight of 394.  He was initially started on a half a tab of phentermine and then increase to a full tablet.  We later added topiramate 25 mg twice daily and increase this to 50 mg twice daily.  He seemed to initially have a pretty good response and around the 2-month javed he had approximate 5% weight loss.  At the 16-week javed, he had lost approximately 22 pounds.  At our 20-week check-in, he reached a uriel of 370 pounds.  Unfortunately, at that point he plateaued and has actually increased slightly.  He wanted to continue with the phentermine so I agree that as long as he saw the dietitian.  He saw her on several occasions and they reviewed his dietary intake.  His weight today is back up to 379 (wearing boots).  I discussed with him at this point we cannot justify staying on the phentermine as he is no longer having much of a response.  He states his biggest issue is not eating what he is supposed to and overeating.  He really could come up with a strategy or a plan for how to counteract this.  I did discuss with him our surgical bariatric program, but he is not interested at this point.  We will have him wean off his topiramate over the next 1 week.  We will have him stop his phentermine.  I did invite him to, can see me at any point if he is feeling more motivated to make some changes in his diet.  I did talk about the 24-week program, but he cannot afford that.  At this point, I am discharging him but he can strongly come back and see me at any point.    CURRENT WEIGHT:   Vitals:    21 0854   Weight: (!) 379 lb 12.8 oz (172.3 kg)       Wt Readings from Last 4  "Encounters:   01/19/21 (!) 379 lb 12.8 oz (172.3 kg)   01/04/21 (!) 373 lb (169.2 kg)   12/04/20 (!) 371 lb (168.3 kg)   11/06/20 (!) 371 lb (168.3 kg)       Height:  Height: 5' 11\" (1.803 m)    Body Mass Index:  Body mass index is 52.97 kg/m .  Vitals:  B/P: Growth percentile SmartLinks can only be used for patients less than 20 years old., P: 60, R: Data Unavailable     Initial consult weight was 394 on 5/2020.  Weight change since last seen on 1/14/2021 is up several pounds.  He would not take his pants off to be weighed.  Total loss is 15 pounds.    No flowsheet data found.    MEDICATIONS:   Current Outpatient Medications on File Prior to Visit   Medication Sig Dispense Refill     fluocinolone acetonide oiL (DERMOTIC) 0.01 % Drop APPLY 3- 5 DROPS TWICE A DAY AS NEEDED       lisinopriL (PRINIVIL,ZESTRIL) 10 MG tablet Take 10 mg by mouth daily.       metoprolol succinate (TOPROL-XL) 50 MG 24 hr tablet Take 50 mg by mouth at bedtime.       multivitamin (MULTI-DAY ORAL) Take by mouth.       phentermine (ADIPEX-P) 37.5 mg tablet TAKE 1 TABLET BY MOUTH BEFORE BREAKFAST 30 tablet 0     topiramate (TOPAMAX) 25 MG tablet Take 2 tabs in am and 2 tab pm 120 tablet 3     vit A/vit C/vit E/zinc/copper (PRESERVISION AREDS ORAL) Take by mouth.       No current facility-administered medications on file prior to visit.        No flowsheet data found.    ASSESSMENT:   1. Class 3 severe obesity with serious comorbidity and body mass index (BMI) of 50.0 to 59.9 in adult, unspecified obesity type (H)  Patient had initial response to phentermine and topiramate, but has not made any progress in the last several months.  Therefore the decision was made to stop his medication.  I invited him to return to see me at any point when he is feeling more motivated to make dietary changes.  I did offer additional resources such as a 24-week program or the surgical program but he declines at this time.  He has seen dietary in the " past.    FOLLOW-UP:    Anytime patient would like to come back in and restart..    Time: 32 min spent on evaluation, management, counseling, education, & motivational interviewing with greater than 50 % of the total time was spent on counseling and coordinating care    Sincerely,    Juanita Thao MD

## 2021-06-14 NOTE — TELEPHONE ENCOUNTER
Can you reach out to pt - He needs to schedule an appt with Dr Thao to follow up weight loss, can be virtual or in person

## 2021-06-14 NOTE — PROGRESS NOTES
"   Onofre Garg is a 65 y.o. male who is being evaluated via a billable telephone visit.      The patient has been notified of following:     \"This telephone visit will be conducted via a call between you and your physician/provider. We have found that certain health care needs can be provided without the need for a physical exam.  This service lets us provide the care you need with a short phone conversation.  If a prescription is necessary we can send it directly to your pharmacy.  If lab work is needed we can place an order for that and you can then stop by our lab to have the test done at a later time.    Telephone visits are billed at different rates depending on your insurance coverage. During this emergency period, for some insurers they may be billed the same as an in-person visit.  Please reach out to your insurance provider with any questions.    If during the course of the call the physician/provider feels a telephone visit is not appropriate, you will not be charged for this service.\"    Patient has given verbal consent to a Telephone visit? Yes    Patient would like to receive their AVS by AVS Preference: Karlos.    Additional provider notes:      Medical  Weight Loss Follow-Up Diet Evaluation  Assessment:  Onofre is presenting today for a follow up weight management nutrition consultation. Pt has had an initial appointment with Dr. Thao.  Weight loss medication: Phentermine and Topiramate.  Pt's Initial Weight: 393 lbs  Weight: (!) 373 lb (169.2 kg)  Weight loss from initial: 20  % Weight loss: 5.09 %    BMI: Body mass index is 52.02 kg/m .  Ideal body weight: 75.3 kg (166 lb 0.1 oz)  Adjusted ideal body weight: 112.9 kg (248 lb 12.9 oz)    2503 kcals x 1.2 (sedentary) = 3004 kcals (for weight maintenance)    Recommended Protein Intake: 100-120 grams of protein/day  Patient Active Problem List:  Patient Active Problem List   Diagnosis     Typical atrial flutter (H)     Morbid obesity with BMI of " "45.0-49.9, adult (H)     Cardiomyopathy (H)     Umbilical hernia without obstruction and without gangrene     Atrial fibrillation and flutter (H)     Erectile dysfunction     Essential hypertension     Morbid obesity (H)     Obstructive sleep apnea     Epidermoid cyst of skin     Sleep apnea     Diabetes: No    Progress on goals from last visit: Was off track around the holidays. He is starting to get back to focusing on his eating and exercise now.    1. Cut back milk intake to 8 oz of skim milk per meal or less - Not met, is going to try   2. Aim to have at least a fruit and/or vegetable serving each day - not met  3. Have a lean protein source at each meal, aim for 6-8 oz protein - not met  4. Aim to eat 3 meals per day - Met    Dietary Recall:  Breakfast: Bagel, glass of milk  Lunch: Bagel, glass of milk  Dinner: Chicken Pot Pie, two piece of bread, glass of milk   Evening snack: popcorn  Beverages:   Water: 4-5 bottles (16 oz)  Diet Coke: \"Now and then\" will have a diet coke  Orange juice - discussed sugar and calorie content and how it can affect weight gain  Exercise:   Plans to get a bow flex machine from his sister.  Stationary bike, 3-4 days per week, for 30 minutes  Nutrition Diagnosis:    Overweight/Obesity (NC 3.3) related to excessive calorie intake as evidenced by BMI 52.    Intervention:  1. Food and/or nutrient delivery: 3 meals per day with a lean protein source.  2. Nutrition counseling: Reviewed patient's current progress toward goals    Monitoring/Evaluation:    Goals:  1. Cut back milk intake to 8 oz of skim milk per meal or less  2. Aim to have at least a fruit and/or vegetable serving each day  3. Have a lean protein source at each meal, aim for 6-8 oz protein  4. Aim to eat 3 meals per day    Patient to follow up with bariatrician and 1 month(s) with RD    Phone call duration: 21 minutes    La Mejia RD      "

## 2021-06-15 NOTE — PROGRESS NOTES
"HPI: Pt is here for follow up of a umbilical hernia repair.  He is doing well. His appetite is good, and bowel function regular.  No fevers or chills. Ambulating without problems.       /72  Pulse 60  Ht 5' 10.75\" (1.797 m)  Wt (!) 345 lb (156.5 kg)  BMI 48.46 kg/m2      EXAM: This is a  62 y.o. MAN in no distress  GENERAL: Appears well  CHEST clear  CVS S1S2 NSR  ABDOMEN: Soft, non-tender. Incision healing  EXT: warm, moves without difficulty      Assessment/Plan:   Umbilical hernia  Doing well  One more week of now heavy lifting  Resume activities as tolerates  Follow up adam Hoffman MD  [facility] Department of Surgery  "

## 2021-06-17 NOTE — TELEPHONE ENCOUNTER
Telephone Encounter by Nova Rodríguez at 5/15/2020  8:07 AM     Author: Nova Rodríguez Service: -- Author Type: --    Filed: 5/15/2020  8:08 AM Encounter Date: 5/12/2020 Status: Signed    : Nova Rodríguez APPROVED:    Approval start date: 05/13/2020  Approval end date:  11/13/2020    Pharmacy has been notified of approval and will contact patient when medication is ready for pickup.

## 2021-06-17 NOTE — TELEPHONE ENCOUNTER
Telephone Encounter by Azucena Rendon at 11/23/2020 11:07 AM     Author: Azucena Rendon Service: -- Author Type: --    Filed: 11/23/2020 11:07 AM Encounter Date: 11/20/2020 Status: Signed    : Azucena Rendon APPROVED:    Approval start date: 11/21/2020  Approval end date:  5/20/2021    Pharmacy has been notified of approval and will contact patient when medication is ready for pickup.

## 2021-09-11 ENCOUNTER — HEALTH MAINTENANCE LETTER (OUTPATIENT)
Age: 66
End: 2021-09-11

## 2021-11-09 ENCOUNTER — HOSPITAL ENCOUNTER (EMERGENCY)
Facility: CLINIC | Age: 66
Discharge: HOME | End: 2021-11-09
Attending: NURSE PRACTITIONER | Admitting: NURSE PRACTITIONER
Payer: COMMERCIAL

## 2021-11-09 ENCOUNTER — APPOINTMENT (OUTPATIENT)
Dept: CT IMAGING | Facility: CLINIC | Age: 66
End: 2021-11-09
Attending: NURSE PRACTITIONER
Payer: COMMERCIAL

## 2021-11-09 VITALS
BODY MASS INDEX: 42.66 KG/M2 | TEMPERATURE: 97.2 F | WEIGHT: 315 LBS | HEIGHT: 72 IN | SYSTOLIC BLOOD PRESSURE: 137 MMHG | HEART RATE: 60 BPM | OXYGEN SATURATION: 96 % | DIASTOLIC BLOOD PRESSURE: 72 MMHG | RESPIRATION RATE: 18 BRPM

## 2021-11-09 DIAGNOSIS — R10.9 FLANK PAIN: ICD-10-CM

## 2021-11-09 DIAGNOSIS — T14.8XXA MUSCLE STRAIN: ICD-10-CM

## 2021-11-09 LAB
ALBUMIN UR-MCNC: NEGATIVE MG/DL
ANION GAP SERPL CALCULATED.3IONS-SCNC: 7 MMOL/L (ref 3–14)
APPEARANCE UR: CLEAR
BASOPHILS # BLD AUTO: 0 10E3/UL (ref 0–0.2)
BASOPHILS NFR BLD AUTO: 0 %
BILIRUB UR QL STRIP: NEGATIVE
BUN SERPL-MCNC: 14 MG/DL (ref 7–30)
CALCIUM SERPL-MCNC: 9.1 MG/DL (ref 8.5–10.1)
CHLORIDE BLD-SCNC: 107 MMOL/L (ref 94–109)
CO2 SERPL-SCNC: 26 MMOL/L (ref 20–32)
COLOR UR AUTO: ABNORMAL
CREAT SERPL-MCNC: 0.88 MG/DL (ref 0.66–1.25)
EOSINOPHIL # BLD AUTO: 0.1 10E3/UL (ref 0–0.7)
EOSINOPHIL NFR BLD AUTO: 2 %
ERYTHROCYTE [DISTWIDTH] IN BLOOD BY AUTOMATED COUNT: 13.6 % (ref 10–15)
GFR SERPL CREATININE-BSD FRML MDRD: >90 ML/MIN/1.73M2
GLUCOSE BLD-MCNC: 105 MG/DL (ref 70–99)
GLUCOSE UR STRIP-MCNC: NEGATIVE MG/DL
HCT VFR BLD AUTO: 42.1 % (ref 40–53)
HGB BLD-MCNC: 14.3 G/DL (ref 13.3–17.7)
HGB UR QL STRIP: NEGATIVE
IMM GRANULOCYTES # BLD: 0.1 10E3/UL
IMM GRANULOCYTES NFR BLD: 1 %
KETONES UR STRIP-MCNC: NEGATIVE MG/DL
LEUKOCYTE ESTERASE UR QL STRIP: NEGATIVE
LYMPHOCYTES # BLD AUTO: 1.5 10E3/UL (ref 0.8–5.3)
LYMPHOCYTES NFR BLD AUTO: 20 %
MCH RBC QN AUTO: 29.7 PG (ref 26.5–33)
MCHC RBC AUTO-ENTMCNC: 34 G/DL (ref 31.5–36.5)
MCV RBC AUTO: 88 FL (ref 78–100)
MONOCYTES # BLD AUTO: 0.6 10E3/UL (ref 0–1.3)
MONOCYTES NFR BLD AUTO: 8 %
NEUTROPHILS # BLD AUTO: 5.3 10E3/UL (ref 1.6–8.3)
NEUTROPHILS NFR BLD AUTO: 69 %
NITRATE UR QL: NEGATIVE
NRBC # BLD AUTO: 0 10E3/UL
NRBC BLD AUTO-RTO: 0 /100
PH UR STRIP: 8 [PH] (ref 5–7)
PLATELET # BLD AUTO: 281 10E3/UL (ref 150–450)
POTASSIUM BLD-SCNC: 4.6 MMOL/L (ref 3.4–5.3)
RBC # BLD AUTO: 4.81 10E6/UL (ref 4.4–5.9)
RBC URINE: <1 /HPF
SODIUM SERPL-SCNC: 140 MMOL/L (ref 133–144)
SP GR UR STRIP: 1.01 (ref 1–1.03)
UROBILINOGEN UR STRIP-MCNC: NORMAL MG/DL
WBC # BLD AUTO: 7.6 10E3/UL (ref 4–11)
WBC URINE: <1 /HPF

## 2021-11-09 PROCEDURE — 36415 COLL VENOUS BLD VENIPUNCTURE: CPT | Performed by: NURSE PRACTITIONER

## 2021-11-09 PROCEDURE — 80048 BASIC METABOLIC PNL TOTAL CA: CPT | Performed by: NURSE PRACTITIONER

## 2021-11-09 PROCEDURE — 258N000003 HC RX IP 258 OP 636: Performed by: NURSE PRACTITIONER

## 2021-11-09 PROCEDURE — 81001 URINALYSIS AUTO W/SCOPE: CPT | Performed by: NURSE PRACTITIONER

## 2021-11-09 PROCEDURE — 96361 HYDRATE IV INFUSION ADD-ON: CPT | Performed by: NURSE PRACTITIONER

## 2021-11-09 PROCEDURE — 99284 EMERGENCY DEPT VISIT MOD MDM: CPT | Performed by: NURSE PRACTITIONER

## 2021-11-09 PROCEDURE — 96374 THER/PROPH/DIAG INJ IV PUSH: CPT | Performed by: NURSE PRACTITIONER

## 2021-11-09 PROCEDURE — 85025 COMPLETE CBC W/AUTO DIFF WBC: CPT | Performed by: NURSE PRACTITIONER

## 2021-11-09 PROCEDURE — 74176 CT ABD & PELVIS W/O CONTRAST: CPT

## 2021-11-09 PROCEDURE — 250N000011 HC RX IP 250 OP 636: Performed by: NURSE PRACTITIONER

## 2021-11-09 PROCEDURE — 99285 EMERGENCY DEPT VISIT HI MDM: CPT | Mod: 25 | Performed by: NURSE PRACTITIONER

## 2021-11-09 RX ORDER — IBUPROFEN 800 MG/1
800 TABLET, FILM COATED ORAL EVERY 8 HOURS PRN
Qty: 30 TABLET | Refills: 0 | Status: SHIPPED | OUTPATIENT
Start: 2021-11-09

## 2021-11-09 RX ORDER — KETOROLAC TROMETHAMINE 30 MG/ML
30 INJECTION, SOLUTION INTRAMUSCULAR; INTRAVENOUS ONCE
Status: COMPLETED | OUTPATIENT
Start: 2021-11-09 | End: 2021-11-09

## 2021-11-09 RX ADMIN — SODIUM CHLORIDE 1000 ML: 9 INJECTION, SOLUTION INTRAVENOUS at 11:17

## 2021-11-09 RX ADMIN — KETOROLAC TROMETHAMINE 30 MG: 30 INJECTION, SOLUTION INTRAMUSCULAR; INTRAVENOUS at 11:18

## 2021-11-09 ASSESSMENT — ENCOUNTER SYMPTOMS
JOINT SWELLING: 0
COUGH: 0
WEAKNESS: 0
CHILLS: 0
FEVER: 0
FATIGUE: 0
DIZZINESS: 0
LIGHT-HEADEDNESS: 0
NUMBNESS: 0
VOMITING: 0
SHORTNESS OF BREATH: 0
DYSURIA: 0
NAUSEA: 0
ARTHRALGIAS: 0
MYALGIAS: 0
HEADACHES: 0
HEMATURIA: 0
FLANK PAIN: 1
ABDOMINAL PAIN: 0

## 2021-11-09 ASSESSMENT — MIFFLIN-ST. JEOR: SCORE: 2544.69

## 2021-11-09 NOTE — ED TRIAGE NOTES
Pt here with right sided flank pain that has been going on for about 2 weeks. Described as constant dull pain. Pt has not tried any medications for this. No known injury.

## 2021-11-09 NOTE — ED PROVIDER NOTES
History     Chief Complaint   Patient presents with     Flank Pain     HPI  Onofre Garg is a 66 year old male who presents emergency department with right sided flank pain for approximately 2 weeks.  Pain is described as a constant dull ache without known aggravating or alleviating factors.  No known injury but does report increased physical activity with chopping and moving wood over the last 3 weeks.  No previous history of kidney stones.  No fevers, abdominal pain, pelvic pain, dysuria, hematuria.    Allergies:  Allergies   Allergen Reactions     Oxycodone      Abdominal pain; see 4-23-12 ED report, and [facility] report, [facility].        Problem List:    Patient Active Problem List    Diagnosis Date Noted     Essential hypertension 12/07/2017     Priority: Medium     Obstructive sleep apnea syndrome 12/07/2017     Priority: Medium     Cardiomyopathy (H) 06/24/2016     Priority: Medium     Advanced directives, counseling/discussion 09/24/2012     Priority: Medium     Discussed advance care planning with patient; however, patient declined at this time. 9/24/2012          Seasonal allergic rhinitis 09/24/2012     Priority: Medium     CARDIOVASCULAR SCREENING; LDL GOAL LESS THAN 160 10/31/2010     Priority: Medium     Diverticulosis of colon 12/22/2008     Priority: Medium     Colonoscopy Dec 2008       Screening for hypertension 12/22/2008     Priority: Medium     Impotence of organic origin 08/17/2006     Priority: Medium     Obesity 08/17/2006     Priority: Medium     Problem list name updated by automated process. Provider to review          Past Medical History:    Past Medical History:   Diagnosis Date     Atrial fibrillation (H)      Disorders of bursae and tendons in shoulder region, unspecified      Hypertension      Morbid obesity with BMI of 45.0-49.9, adult (H)      Nonocclusive coronary atherosclerosis of native coronary artery 06/29/2016     Obesity, unspecified      BEVERLEY (obstructive  sleep apnea)      Pelvic fracture (H)        Past Surgical History:    Past Surgical History:   Procedure Laterality Date     ENDOSCOPIC RETROGRADE CHOLANGIOPANCREATOGRAM  10/4/2011    Procedure:ENDOSCOPIC RETROGRADE CHOLANGIOPANCREATOGRAM; ERCP, biliary duct sphincterotomy, baloon dilatation, stone extraction and stent placement; Surgeon:TERE BARBOSA; Location:[facility] OR     EXCISE LESION UPPER EXTREMITY Left 12/7/2017    Procedure: EXCISION OF CYST LEFT ARM;  Surgeon: Austin Hoffman MD;  Location: [facility];  Service:      HERNIA REPAIR, UMBILICAL N/A 12/7/2017    Procedure: UMBILICAL HERNIA REPAIR WITH MESH;  Surgeon: Austin Hoffman MD;  Location: [facility] OR;  Service:      LAPAROSCOPIC CHOLECYSTECTOMY  10/6/2011    Procedure:LAPAROSCOPIC CHOLECYSTECTOMY; Laparoscopic Cholecystectomy; Surgeon:HALEY TRINH; Location:[facility] OR     LAPAROSCOPIC CHOLECYSTECTOMY      [facility]     OPEN REDUCTION INTERNAL FIXATION RODDING INTRAMEDULLARY TIBIA Right     leg and ankle     OTHER SURGICAL HISTORY  07/07/2016    RFA of isthmus for typical atrial flutterby Dr. Blake     PELVIC FRACTURE SURGERY         Family History:    Family History   Problem Relation Age of Onset     Diabetes Mother      Cerebrovascular Disease Mother      Eye Disorder Father      Allergies Daughter      Allergies Daughter      Acute Myocardial Infarction Mother         with [facility]     Acute Myocardial Infarction Brother      Coronary Stenting Brother      Colon Cancer Brother      No Known Problems Daughter      No Known Problems Daughter      Alcoholism Brother        Social History:  Marital Status:   [4]  Social History     Tobacco Use     Smoking status: Never Smoker     Smokeless tobacco: Never Used   Substance Use Topics     Alcohol use: Yes     Alcohol/week: 6.0 standard drinks     Types: 6 Cans of beer per week     Comment: Alcoholic Drinks/day: 2 times week     Drug use: No        Medications:    ibuprofen  "(ADVIL/MOTRIN) 800 MG tablet  fluticasone (FLONASE) 50 MCG/ACT nasal spray  HYDROmorphone (DILAUDID) 2 MG tablet  lisinopril (PRINIVIL/ZESTRIL) 10 MG tablet  metoprolol succinate ER (TOPROL-XL) 50 MG 24 hr tablet  pseudoePHEDrine (SUDAFED) 120 MG 12 hr tablet  sildenafil (VIAGRA) 50 MG tablet          Review of Systems   Constitutional: Negative for chills, fatigue and fever.   Respiratory: Negative for cough and shortness of breath.    Cardiovascular: Negative for chest pain.   Gastrointestinal: Negative for abdominal pain, nausea and vomiting.   Genitourinary: Positive for flank pain. Negative for dysuria and hematuria.   Musculoskeletal: Negative for arthralgias, joint swelling and myalgias.   Skin: Negative for rash.   Neurological: Negative for dizziness, weakness, light-headedness, numbness and headaches.   All other systems reviewed and are negative.      Physical Exam   BP: (!) 149/82  Pulse: 72  Temp: 97.2  F (36.2  C)  Resp: 18  Height: 181.8 cm (5' 11.56\")  Weight: (!) 172.4 kg (380 lb)  SpO2: 96 %      Physical Exam  Constitutional:       General: He is not in acute distress.     Appearance: He is well-developed. He is not diaphoretic.   HENT:      Head: Normocephalic.   Eyes:      Conjunctiva/sclera: Conjunctivae normal.      Pupils: Pupils are equal, round, and reactive to light.   Cardiovascular:      Rate and Rhythm: Normal rate and regular rhythm.      Pulses: Normal pulses.   Pulmonary:      Effort: Pulmonary effort is normal. No respiratory distress.      Breath sounds: Normal breath sounds and air entry. No decreased air movement. No decreased breath sounds, wheezing or rhonchi.   Abdominal:      General: There is no distension.      Palpations: Abdomen is soft.      Tenderness: There is no abdominal tenderness. There is right CVA tenderness. There is no left CVA tenderness, guarding or rebound.   Musculoskeletal:         General: Normal range of motion.      Cervical back: Normal range of motion " and neck supple.   Skin:     General: Skin is warm.      Capillary Refill: Capillary refill takes less than 2 seconds.   Neurological:      General: No focal deficit present.      Mental Status: He is alert and oriented to person, place, and time.   Psychiatric:         Mood and Affect: Mood normal.       ED Course        Procedures      Results for orders placed or performed during the hospital encounter of 11/09/21 (from the past 24 hour(s))   Basic metabolic panel   Result Value Ref Range    Sodium 140 133 - 144 mmol/L    Potassium 4.6 3.4 - 5.3 mmol/L    Chloride 107 94 - 109 mmol/L    Carbon Dioxide (CO2) 26 20 - 32 mmol/L    Anion Gap 7 3 - 14 mmol/L    Urea Nitrogen 14 7 - 30 mg/dL    Creatinine 0.88 0.66 - 1.25 mg/dL    Calcium 9.1 8.5 - 10.1 mg/dL    Glucose 105 (H) 70 - 99 mg/dL    GFR Estimate >90 >60 mL/min/1.73m2   UA with Microscopic reflex to Culture    Specimen: Urine, Clean Catch   Result Value Ref Range    Color Urine Straw Colorless, Straw, Light Yellow, Yellow    Appearance Urine Clear Clear    Glucose Urine Negative Negative mg/dL    Bilirubin Urine Negative Negative    Ketones Urine Negative Negative mg/dL    Specific Gravity Urine 1.006 1.003 - 1.035    Blood Urine Negative Negative    pH Urine 8.0 (H) 5.0 - 7.0    Protein Albumin Urine Negative Negative mg/dL    Urobilinogen Urine Normal Normal, 2.0 mg/dL    Nitrite Urine Negative Negative    Leukocyte Esterase Urine Negative Negative    RBC Urine <1 <=2 /HPF    WBC Urine <1 <=5 /HPF    Narrative    Urine Culture not indicated   Abd/pelvis CT - no contrast - Stone Protocol    Narrative    CT ABDOMEN AND PELVIS WITHOUT CONTRAST 11/9/2021 12:05 PM    HISTORY: Flank pain, kidney stone suspected.    TECHNIQUE: Noncontrast CT abdomen and pelvis was performed. Radiation  dose for this scan was reduced using automated exposure control,  adjustment of the mA and/or kV according to patient size, or iterative  reconstruction technique.    COMPARISON:  CT abdomen and pelvis on 10/3/2011.    FINDINGS:  Lower chest: Lung bases are clear.    Right kidney: No radiodense kidney/ureteral stones or hydronephrosis.    Left kidney: No radiodense kidney/ureteral stones or hydronephrosis.    Urinary bladder: Unremarkable.    Remainder of the abdomen and pelvis: Limited evaluation of the  abdominal organs due to lack of IV contrast.    Hepatobiliary: Diffuse hypoattenuation of the liver, likely due to  underlying hepatic steatosis. Right upper quadrant postcholecystectomy  clips.    Pancreas: The unenhanced pancreas is grossly unremarkable.    Spleen: No splenomegaly. Multiple calcified splenic granulomas.    Adrenal glands: No adrenal nodules.    Bowels: No abnormally dilated bowel loops. Colonic diverticulosis  predominantly of the sigmoid colon without CT evidence of acute  diverticulitis. The appendix is visualized and appears normal.    Peritoneum: No significant free fluid in the abdomen and pelvis. No  free peritoneal or portal venous gas.    Pelvic organs: Scattered coarse prostatic calcification.    Vascular: Scattered atherosclerotic vascular calcification of the  abdominal aorta and iliac vessels.    Lymph nodes: Multiple prominent retroperitoneal lymph nodes, not  significantly changed as compared to 10/3/2011 exam, remains  indeterminate, could be reactive.    Bones and soft tissue: Postsurgical changes of the anterior pubic  bones. Multilevel degenerative changes of the spine. No suspicious  osseous lesion.       Impression    IMPRESSION:   1. No radiodense kidney/ureteral stones or hydronephrosis in either  kidney.  2. Hepatic steatosis.  3. Colonic diverticulosis without CT evidence of acute diverticulitis.  4. Multiple prominent retroperitoneal lymph nodes, not significantly  changed as compared to 10/3/2011 exam, remain indeterminate, but  likely benign given their interval stability.     CBC with platelets, differential    Narrative    The following orders  were created for panel order CBC with platelets, differential.  Procedure                               Abnormality         Status                     ---------                               -----------         ------                     CBC with platelets and d...[[id number]]  Abnormal            Final result                 Please view results for these tests on the individual orders.   CBC with platelets and differential   Result Value Ref Range    WBC Count 7.6 4.0 - 11.0 10e3/uL    RBC Count 4.81 4.40 - 5.90 10e6/uL    Hemoglobin 14.3 13.3 - 17.7 g/dL    Hematocrit 42.1 40.0 - 53.0 %    MCV 88 78 - 100 fL    MCH 29.7 26.5 - 33.0 pg    MCHC 34.0 31.5 - 36.5 g/dL    RDW 13.6 10.0 - 15.0 %    Platelet Count 281 150 - 450 10e3/uL    % Neutrophils 69 %    % Lymphocytes 20 %    % Monocytes 8 %    % Eosinophils 2 %    % Basophils 0 %    % Immature Granulocytes 1 %    NRBCs per 100 WBC 0 <1 /100    Absolute Neutrophils 5.3 1.6 - 8.3 10e3/uL    Absolute Lymphocytes 1.5 0.8 - 5.3 10e3/uL    Absolute Monocytes 0.6 0.0 - 1.3 10e3/uL    Absolute Eosinophils 0.1 0.0 - 0.7 10e3/uL    Absolute Basophils 0.0 0.0 - 0.2 10e3/uL    Absolute Immature Granulocytes 0.1 (H) <=0.0 10e3/uL    Absolute NRBCs 0.0 10e3/uL       Medications   0.9% sodium chloride BOLUS (0 mLs Intravenous Stopped 11/9/21 1311)   ketorolac (TORADOL) injection 30 mg (30 mg Intravenous Given 11/9/21 1118)       Assessments & Plan (with Medical Decision Making)   Onofre Garg is a 66 year old male who presents emergency department with right sided flank pain for approximately 2 weeks.  Pain is described as a constant dull ache without known aggravating or alleviating factors.  No known injury but does report increased physical activity with chopping and moving wood over the last 3 weeks.  Vital signs normal.  Physical exam as above. Normal CBC, BMP and UA. CT without evidence of calculi, no other acute findings, see full read above. Exam consistent with muscle  strain.  Ibuprofen as needed.  Discussed stretching.  Return with new or worsening symptoms.  Follow-up with primary care if symptoms persist.  Patient agreeable and discharged in good condition.  I have reviewed the nursing notes.    I have reviewed the findings, diagnosis, plan and need for follow up with the patient.  Discharge Medication List as of 11/9/2021  1:12 PM      START taking these medications    Details   ibuprofen (ADVIL/MOTRIN) 800 MG tablet Take 1 tablet (800 mg) by mouth every 8 hours as needed for moderate pain, Disp-30 tablet, R-0, E-Prescribe             Final diagnoses:   Flank pain   Muscle strain       11/9/2021   [facility] EMERGENCY DEPT     Azucena Becker, APRN CNP  11/09/21 [id number]

## 2021-12-01 ENCOUNTER — LAB (OUTPATIENT)
Dept: LAB | Facility: CLINIC | Age: 66
End: 2021-12-01
Payer: COMMERCIAL

## 2021-12-01 DIAGNOSIS — C61 PROSTATE CANCER (H): Primary | ICD-10-CM

## 2021-12-01 PROCEDURE — 36415 COLL VENOUS BLD VENIPUNCTURE: CPT

## 2022-04-08 ENCOUNTER — LAB REQUISITION (OUTPATIENT)
Dept: LAB | Facility: CLINIC | Age: 67
End: 2022-04-08

## 2022-04-08 DIAGNOSIS — I10 ESSENTIAL (PRIMARY) HYPERTENSION: ICD-10-CM

## 2022-04-08 LAB
ALBUMIN SERPL-MCNC: 3.7 G/DL (ref 3.5–5)
ALP SERPL-CCNC: 66 U/L (ref 45–120)
ALT SERPL W P-5'-P-CCNC: 28 U/L (ref 0–45)
ANION GAP SERPL CALCULATED.3IONS-SCNC: 20 MMOL/L (ref 5–18)
AST SERPL W P-5'-P-CCNC: 21 U/L (ref 0–40)
BILIRUB SERPL-MCNC: 0.5 MG/DL (ref 0–1)
BUN SERPL-MCNC: 18 MG/DL (ref 8–22)
CALCIUM SERPL-MCNC: 9.6 MG/DL (ref 8.5–10.5)
CHLORIDE BLD-SCNC: 104 MMOL/L (ref 98–107)
CHOLEST SERPL-MCNC: 203 MG/DL
CO2 SERPL-SCNC: 17 MMOL/L (ref 22–31)
CREAT SERPL-MCNC: 0.88 MG/DL (ref 0.7–1.3)
FASTING STATUS PATIENT QL REPORTED: ABNORMAL
GFR SERPL CREATININE-BSD FRML MDRD: >90 ML/MIN/1.73M2
GLUCOSE BLD-MCNC: 101 MG/DL (ref 70–125)
HDLC SERPL-MCNC: 43 MG/DL
LDLC SERPL CALC-MCNC: 116 MG/DL
POTASSIUM BLD-SCNC: 4.8 MMOL/L (ref 3.5–5)
PROT SERPL-MCNC: 6.6 G/DL (ref 6–8)
PSA SERPL-MCNC: 7.35 UG/L (ref 0–4.5)
SODIUM SERPL-SCNC: 141 MMOL/L (ref 136–145)
TRIGL SERPL-MCNC: 222 MG/DL

## 2022-04-08 PROCEDURE — 80061 LIPID PANEL: CPT | Performed by: FAMILY MEDICINE

## 2022-04-08 PROCEDURE — 80053 COMPREHEN METABOLIC PANEL: CPT | Performed by: FAMILY MEDICINE

## 2022-04-08 PROCEDURE — G0103 PSA SCREENING: HCPCS | Performed by: FAMILY MEDICINE

## 2022-10-30 ENCOUNTER — HEALTH MAINTENANCE LETTER (OUTPATIENT)
Age: 67
End: 2022-10-30

## 2023-07-21 NOTE — OP NOTE
Preop diagnosis: Right perianal abscess    Postop diagnosis: Same    Procedure: Incision and drainage of right perianal abscess    Surgeon: Favian    Procedure: Patient was in the emergency room in the right lateral decubitus position.  His perianal area was swabbed with alcohol and the affected area injected with 1% lidocaine with epinephrine.  A 1 cm elliptical incision was made and the skin removed.  A moderate amount of foul-smelling purulent drainage was expressed.  The wound was irrigated with normal saline.  Wound was then covered with dry gauze.      Assessment and plan: Status post I&D of perianal abscess.  Patient advised to soak his perianal area daily in warm water and to expect bleeding and drainage over the next several days.  Recommend discharge on 1 week of oral antibiotics and pain medication.  Follow-up with me as necessary.    Jagjit Ballesteros MD   
stated

## 2023-11-12 ENCOUNTER — HEALTH MAINTENANCE LETTER (OUTPATIENT)
Age: 68
End: 2023-11-12